# Patient Record
Sex: MALE | Race: WHITE | NOT HISPANIC OR LATINO | Employment: OTHER | URBAN - METROPOLITAN AREA
[De-identification: names, ages, dates, MRNs, and addresses within clinical notes are randomized per-mention and may not be internally consistent; named-entity substitution may affect disease eponyms.]

---

## 2018-03-27 ENCOUNTER — OFFICE VISIT (OUTPATIENT)
Dept: URGENT CARE | Facility: MEDICAL CENTER | Age: 63
End: 2018-03-27
Payer: COMMERCIAL

## 2018-03-27 VITALS
TEMPERATURE: 97.8 F | OXYGEN SATURATION: 93 % | WEIGHT: 158.8 LBS | HEART RATE: 70 BPM | HEIGHT: 67 IN | DIASTOLIC BLOOD PRESSURE: 70 MMHG | BODY MASS INDEX: 24.92 KG/M2 | SYSTOLIC BLOOD PRESSURE: 132 MMHG | RESPIRATION RATE: 18 BRPM
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
## 2018-03-27 PROCEDURE — 99203 OFFICE O/P NEW LOW 30 MIN: CPT | Performed by: PHYSICIAN ASSISTANT

## 2018-03-27 RX ORDER — OMEPRAZOLE 20 MG/1
20 CAPSULE, DELAYED RELEASE ORAL DAILY
COMMUNITY

## 2018-03-27 RX ORDER — MELATONIN
1000 DAILY
COMMUNITY

## 2018-03-27 RX ORDER — CLINDAMYCIN HYDROCHLORIDE 150 MG/1
300 CAPSULE ORAL 3 TIMES DAILY
Qty: 30 CAPSULE | Refills: 0 | Status: SHIPPED | OUTPATIENT
Start: 2018-03-27 | End: 2018-03-27 | Stop reason: SDUPTHER

## 2018-03-27 RX ORDER — PHENOL 1.4 %
600 AEROSOL, SPRAY (ML) MUCOUS MEMBRANE 2 TIMES DAILY WITH MEALS
COMMUNITY

## 2018-03-27 RX ORDER — CLINDAMYCIN HYDROCHLORIDE 150 MG/1
300 CAPSULE ORAL 3 TIMES DAILY
Qty: 30 CAPSULE | Refills: 0 | Status: SHIPPED | OUTPATIENT
Start: 2018-03-27 | End: 2018-04-01

## 2018-03-27 RX ORDER — SPIRONOLACTONE 25 MG/1
25 TABLET ORAL DAILY
COMMUNITY

## 2018-03-27 RX ORDER — FUROSEMIDE 20 MG/1
20 TABLET ORAL DAILY
COMMUNITY

## 2018-03-27 RX ORDER — SULFAMETHOXAZOLE AND TRIMETHOPRIM 800; 160 MG/1; MG/1
1 TABLET ORAL EVERY 12 HOURS SCHEDULED
Qty: 10 TABLET | Refills: 0 | Status: SHIPPED | OUTPATIENT
Start: 2018-03-27 | End: 2018-04-01

## 2018-03-27 RX ORDER — SULFAMETHOXAZOLE AND TRIMETHOPRIM 800; 160 MG/1; MG/1
1 TABLET ORAL EVERY 12 HOURS SCHEDULED
Qty: 10 TABLET | Refills: 0 | Status: SHIPPED | OUTPATIENT
Start: 2018-03-27 | End: 2018-03-27 | Stop reason: SDUPTHER

## 2018-03-27 RX ORDER — PROPRANOLOL HYDROCHLORIDE 10 MG/1
10 TABLET ORAL 3 TIMES DAILY
COMMUNITY

## 2018-03-27 NOTE — PATIENT INSTRUCTIONS
Dog bite  Tetanus administered in office  Wound cleaned and wrapped  Wash daily with soap and water  Apply neosporin and keep covered  Take clindamycin and bactrim as directed for 5 days  Take with food and eat yogurt or a probiotic to avoid GI upset  Follow up with your PCP in 2-5 days for wound check  Watch for signs of infection and follow up sooner if they occur  Go to the ER for any distress

## 2018-03-27 NOTE — PROGRESS NOTES
[facility]        NAME: Nathaniel Palacios is a 60 y o  male  : 1955    MRN: [id number]  DATE: 2018  TIME: 7:04 PM    Assessment and Plan   Dog bite, initial encounter [W54  0XXA]  1  Dog bite, initial encounter  TDAP Vaccine greater than or equal to 8yo    clindamycin (CLEOCIN) 150 mg capsule    sulfamethoxazole-trimethoprim (BACTRIM DS) 800-160 mg per tablet         Patient Instructions     Tetanus administered in office  Wound cleaned and wrapped  Wash daily with soap and water  Apply neosporin and keep covered  Take clindamycin and bactrim as directed for 5 days  Take with food and eat yogurt or a probiotic to avoid GI upset  Follow up with your PCP in 2-5 days for wound check  Watch for signs of infection and follow up sooner if they occur  Go to the ER for any distress  Follow up with PCP in 3-5 days  Proceed to  ER if symptoms worsen  Chief Complaint     Chief Complaint   Patient presents with    Dog Bite         History of Present Illness       This is a 60year old male presenting for dog bite 3 hours ago  He was cleaning his own dogs ear when it bit him on the left hand  He is left hand dominant  He does not know when his last tetanus shot was  The dog is up to date on its rabies vaccinations  He denies numbness, tingling, weakness in the hand  He has a history of cirrhosis and does get infections easily  Review of Systems   Review of Systems   Constitutional: Negative for chills, fatigue and fever  HENT: Negative  Respiratory: Negative  Cardiovascular: Negative  Musculoskeletal: Negative for arthralgias, gait problem, joint swelling, myalgias, neck pain and neck stiffness  Skin: Positive for wound  Negative for color change, pallor and rash  Neurological: Negative for dizziness, weakness and numbness           Current Medications       Current Outpatient Prescriptions:     calcium carbonate (OS-EVY) 600 MG tablet, Take 600 mg by mouth 2 (two) times a day with meals, Disp: , Rfl:     cholecalciferol (VITAMIN D3) 1,000 units tablet, Take 1,000 Units by mouth daily, Disp: , Rfl:     furosemide (LASIX) 20 mg tablet, Take 20 mg by mouth daily, Disp: , Rfl:     Macitentan (OPSUMIT PO), Take 1 tablet by mouth daily, Disp: , Rfl:     omeprazole (PriLOSEC) 20 mg delayed release capsule, Take 20 mg by mouth daily, Disp: , Rfl:     propranolol (INDERAL) 10 mg tablet, Take 10 mg by mouth 3 (three) times a day, Disp: , Rfl:     rifaximin (XIFAXAN) 200 mg tablet, Take 200 mg by mouth 2 (two) times a day, Disp: , Rfl:     spironolactone (ALDACTONE) 25 mg tablet, Take 25 mg by mouth daily, Disp: , Rfl:     clindamycin (CLEOCIN) 150 mg capsule, Take 2 capsules (300 mg total) by mouth 3 (three) times a day for 5 days, Disp: 30 capsule, Rfl: 0    sulfamethoxazole-trimethoprim (BACTRIM DS) 800-160 mg per tablet, Take 1 tablet by mouth every 12 (twelve) hours for 5 days, Disp: 10 tablet, Rfl: 0    Current Allergies     Allergies as of 03/27/2018    (No Known Allergies)            The following portions of the patient's history were reviewed and updated as appropriate: allergies, current medications, past family history, past medical history, past social history, past surgical history and problem list      Past Medical History:   Diagnosis Date    [facility] ([facility] )     GERD (gastroesophageal reflux disease)     Pulmonary HTN        Past Surgical History:   Procedure Laterality Date    CHOLECYSTECTOMY         No family history on file  Medications have been verified  Objective   /70   Pulse 70   Temp 97 8 °F (36 6 °C) (Temporal)   Resp 18   Ht 5' 7" (1 702 m)   Wt 72 kg (158 lb 12 8 oz)   SpO2 93%   BMI 24 87 kg/m²        Physical Exam     Physical Exam   Constitutional: He appears well-developed and well-nourished  No distress  Musculoskeletal: Normal range of motion  He exhibits tenderness  He exhibits no edema or deformity  Neurological: He is alert  Skin: Skin is warm and dry  He is not diaphoretic  Left hand: there are 3 larger puncture wounds on the dorsal aspect of the hand at the base of the metacarpals with one small skin-flap wound  About 3 cm distal to the first 3 wounds are another 3 puncture wounds, much smaller in size  There is one more puncture wound just distal to the second row  There is a single puncture wound on the palmar surface of the hand  Hemostasis achieved with pressure  There are no suturable wounds  No drainage, skin redness, swelling  FROM of wrist and fingers, sensation intact, cap refill in the distal fingers is less than 3 seconds  Nursing note and vitals reviewed  The wound is irrigated with saline and scrubbed with surgical scrub  The wound is wrapped with gauze  Tdap administered

## 2019-08-20 ENCOUNTER — TRANSCRIBE ORDERS (OUTPATIENT)
Dept: ADMINISTRATIVE | Facility: HOSPITAL | Age: 64
End: 2019-08-20

## 2019-08-20 DIAGNOSIS — K73.9 CHRONIC HEPATITIS (HCC): Primary | ICD-10-CM

## 2019-09-04 ENCOUNTER — HOSPITAL ENCOUNTER (OUTPATIENT)
Dept: MRI IMAGING | Facility: HOSPITAL | Age: 64
Discharge: HOME | End: 2019-09-04
Payer: COMMERCIAL

## 2019-09-04 DIAGNOSIS — K73.9 CHRONIC HEPATITIS (HCC): ICD-10-CM

## 2019-09-04 PROCEDURE — 74183 MRI ABD W/O CNTR FLWD CNTR: CPT

## 2019-09-04 PROCEDURE — A9585 GADOBUTROL INJECTION: HCPCS | Performed by: RADIOLOGY

## 2019-09-04 RX ADMIN — GADOBUTROL 7 ML: 604.72 INJECTION INTRAVENOUS at 15:27

## 2020-02-21 ENCOUNTER — OFFICE VISIT (OUTPATIENT)
Dept: URGENT CARE | Facility: MEDICAL CENTER | Age: 65
End: 2020-02-21
Payer: COMMERCIAL

## 2020-02-21 VITALS
HEART RATE: 74 BPM | HEIGHT: 67 IN | TEMPERATURE: 97.7 F | SYSTOLIC BLOOD PRESSURE: 110 MMHG | WEIGHT: 155.38 LBS | DIASTOLIC BLOOD PRESSURE: 70 MMHG | RESPIRATION RATE: 18 BRPM | BODY MASS INDEX: 24.39 KG/M2
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
## 2020-02-21 DIAGNOSIS — S41.151A DOG BITE OF RIGHT UPPER EXTREMITY, INITIAL ENCOUNTER: Primary | ICD-10-CM

## 2020-02-21 PROCEDURE — 99213 OFFICE O/P EST LOW 20 MIN: CPT | Performed by: FAMILY MEDICINE

## 2020-02-21 RX ORDER — AMOXICILLIN AND CLAVULANATE POTASSIUM 875; 125 MG/1; MG/1
1 TABLET, FILM COATED ORAL EVERY 12 HOURS SCHEDULED
Qty: 14 TABLET | Refills: 0 | Status: SHIPPED | OUTPATIENT
Start: 2020-02-21 | End: 2020-02-28

## 2020-02-21 RX ORDER — AMOXICILLIN AND CLAVULANATE POTASSIUM 875; 125 MG/1; MG/1
1 TABLET, FILM COATED ORAL EVERY 12 HOURS SCHEDULED
Qty: 14 TABLET | Refills: 0 | Status: SHIPPED | OUTPATIENT
Start: 2020-02-21 | End: 2020-02-21 | Stop reason: SDUPTHER

## 2020-02-21 NOTE — PATIENT INSTRUCTIONS
Ice for 20-30 minutes, 3 to 4 times a day  Ibuprofen for pain  Follow up with PCP in 3-5 days  Proceed to  ER if symptoms worsen  Animal Bite   WHAT YOU NEED TO KNOW:   Animal bite injuries range from shallow cuts to deep, life-threatening wounds  An animal can cut or puncture the skin when it bites  Your skin may be torn from your body  Your skin may swell or bruise even if the bite does not break the skin  Animal bites occur more often on the hands, arms, legs, and face  Bites from dogs and cats are the most common injuries  DISCHARGE INSTRUCTIONS:   Return to the emergency department if:   · You have a fever  · Your wound is red, swollen, and draining pus  · You see red streaks on the skin around the wound  · You can no longer move the bitten area  · Your heartbeat and breathing are much faster than usual     · You feel dizzy and confused  Contact your healthcare provider if:   · Your pain does not get better, even after you take pain medicine  · You have nightmares or flashbacks about the animal bite  · You have questions or concerns about your condition or care  Medicines: You may need any of the following:  · Antibiotics  prevent or treat a bacterial infection  · Prescription pain medicine  may be given  Ask how to take this medicine safely  · A tetanus vaccine  may be needed to prevent tetanus  Tetanus is a life-threatening bacterial infection that affects the nerves and muscles  The bacteria can be spread through animal bites  · A rabies vaccine  may be needed to prevent rabies  Rabies is a life-threatening viral infection  The virus can be spread through animal bites  · Take your medicine as directed  Contact your healthcare provider if you think your medicine is not helping or if you have side effects  Tell him of her if you are allergic to any medicine  Keep a list of the medicines, vitamins, and herbs you take   Include the amounts, and when and why you take them  Bring the list or the pill bottles to follow-up visits  Carry your medicine list with you in case of an emergency  Follow up with your healthcare provider in 1 to 2 days: You may need to return to have your stitches removed  Write down your questions so you remember to ask them during your visits  Self-care:   · Apply antibiotic ointment as directed  This helps prevent infection in minor skin wounds  It is available without a doctor's order  · Keep the wound clean and covered  Wash the wound every day with soap and water or germ-killing cleanser  Ask your healthcare provider about the kinds of bandages to use  · Apply ice on your wound  Ice helps decrease swelling and pain  Ice may also help prevent tissue damage  Use an ice pack, or put crushed ice in a plastic bag  Cover it with a towel and place it on your wound for 15 to 20 minutes every hour or as directed  · Elevate the wound area  Raise your wound above the level of your heart as often as you can  This will help decrease swelling and pain  Prop your wound on pillows or blankets to keep it elevated comfortably  Prevent another animal bite:   · Learn to recognize the signs of a scared or angry pet  Avoid quick, sudden movements  · Do not step between animals that are fighting  · Do not leave a pet alone with a young child  · Do not disturb an animal while it eats, sleeps, or cares for its young  · Do not approach an animal you do not know, especially one that is tied up or caged  · Stay away from animals that seem sick or act strangely  · Do not feed or capture wild animals  © 2017 [employer]  Information is for End User's use only and may not be sold, redistributed or otherwise used for commercial purposes  All illustrations and images included in CareNotes® are the copyrighted property of [employer] M [employer]  or [employer]  The above information is an  only   It is not intended as medical advice for individual conditions or treatments  Talk to your doctor, nurse or pharmacist before following any medical regimen to see if it is safe and effective for you

## 2020-02-21 NOTE — PROGRESS NOTES
[facility]        NAME: Adline Frankel is a 71 y o  male  : 1955    MRN: [id number]  DATE: 2020  TIME: 8:37 AM    Assessment and Plan   Dog bite of right upper extremity, initial encounter [S41 151A, W54  0XXA]  1  Dog bite of right upper extremity, initial encounter  amoxicillin-clavulanate (AUGMENTIN) 875-125 mg per tablet         Patient Instructions     Ice for 20-30 minutes, 3 to 4 times a day  Ibuprofen for pain  Follow up with PCP in 3-5 days  Proceed to  ER if symptoms worsen  Chief Complaint     Chief Complaint   Patient presents with    Animal Bite     rigth wrist and forearm  area is swollen  Pt was trying to separte two dogs  one being the neighbors the other was foster dog  foster dog was a pit bull   this dog was sent back to recuse center  pt stated that dog was up to date with vaccine  he beileves it was his dog that bite his right forearm and wirst  both dog are up to date with rabies vaccine  History of Present Illness       Animal Bite (rigth wrist and forearm  area is swollen  Pt was trying to separte two dogs  one being the neighbors the other was foster dog  foster dog was a pit bull   this dog was sent back to recuse center  pt stated that dog was up to date with vaccine  he beileves it was his dog that bite his right forearm and wrist  both dog are up to date with rabies vaccine  )  Incident occurred on 02/15/2020      Review of Systems   Review of Systems   Constitutional: Negative  Respiratory: Negative  Cardiovascular: Negative  Skin: Positive for wound           Current Medications       Current Outpatient Medications:     calcium carbonate (OS-EVY) 600 MG tablet, Take 600 mg by mouth 2 (two) times a day with meals, Disp: , Rfl:     cholecalciferol (VITAMIN D3) 1,000 units tablet, Take 1,000 Units by mouth daily, Disp: , Rfl:     furosemide (LASIX) 20 mg tablet, Take 20 mg by mouth daily, Disp: , Rfl:     Macitentan (OPSUMIT PO), Take 1 tablet by mouth daily, Disp: , Rfl:     omeprazole (PriLOSEC) 20 mg delayed release capsule, Take 20 mg by mouth daily, Disp: , Rfl:     propranolol (INDERAL) 10 mg tablet, Take 10 mg by mouth 3 (three) times a day, Disp: , Rfl:     rifaximin (XIFAXAN) 200 mg tablet, Take 200 mg by mouth 2 (two) times a day, Disp: , Rfl:     spironolactone (ALDACTONE) 25 mg tablet, Take 25 mg by mouth daily, Disp: , Rfl:     amoxicillin-clavulanate (AUGMENTIN) 875-125 mg per tablet, Take 1 tablet by mouth every 12 (twelve) hours for 7 days, Disp: 14 tablet, Rfl: 0    Current Allergies     Allergies as of 02/21/2020    (No Known Allergies)            The following portions of the patient's history were reviewed and updated as appropriate: allergies, current medications, past family history, past medical history, past social history, past surgical history and problem list      Past Medical History:   Diagnosis Date    [facility]     GERD (gastroesophageal reflux disease)     Pulmonary HTN ([facility] )        Past Surgical History:   Procedure Laterality Date    CHOLECYSTECTOMY         No family history on file  Medications have been verified  Objective   /70   Pulse 74   Temp 97 7 °F (36 5 °C)   Resp 18   Ht 5' 7" (1 702 m)   Wt 70 5 kg (155 lb 6 oz)   BMI 24 34 kg/m²        Physical Exam     Physical Exam   Constitutional: He appears well-developed and well-nourished  Cardiovascular: Normal rate and regular rhythm     Pulmonary/Chest: Effort normal and breath sounds normal    Skin:

## 2020-03-17 ENCOUNTER — APPOINTMENT (OUTPATIENT)
Dept: RADIOLOGY | Facility: MEDICAL CENTER | Age: 65
End: 2020-03-17
Payer: COMMERCIAL

## 2020-03-17 ENCOUNTER — OFFICE VISIT (OUTPATIENT)
Dept: URGENT CARE | Facility: MEDICAL CENTER | Age: 65
End: 2020-03-17
Payer: COMMERCIAL

## 2020-03-17 VITALS
HEIGHT: 67 IN | RESPIRATION RATE: 20 BRPM | HEART RATE: 64 BPM | OXYGEN SATURATION: 93 % | TEMPERATURE: 97.5 F | DIASTOLIC BLOOD PRESSURE: 72 MMHG | WEIGHT: 155 LBS | SYSTOLIC BLOOD PRESSURE: 148 MMHG | BODY MASS INDEX: 24.33 KG/M2

## 2020-03-17 DIAGNOSIS — M79.641 PAIN OF RIGHT HAND: ICD-10-CM

## 2020-03-17 DIAGNOSIS — M79.641 PAIN OF RIGHT HAND: Primary | ICD-10-CM

## 2020-03-17 PROCEDURE — 73130 X-RAY EXAM OF HAND: CPT

## 2020-03-17 PROCEDURE — G0382 LEV 3 HOSP TYPE B ED VISIT: HCPCS | Performed by: PHYSICIAN ASSISTANT

## 2020-03-17 PROCEDURE — S9083 URGENT CARE CENTER GLOBAL: HCPCS | Performed by: PHYSICIAN ASSISTANT

## 2020-03-17 RX ORDER — SULFAMETHOXAZOLE AND TRIMETHOPRIM 800; 160 MG/1; MG/1
1 TABLET ORAL EVERY 12 HOURS SCHEDULED
Qty: 14 TABLET | Refills: 0 | Status: SHIPPED | OUTPATIENT
Start: 2020-03-17 | End: 2020-03-24

## 2020-03-17 NOTE — PATIENT INSTRUCTIONS
Hand pain  Bactrim DS twice daily  Follow up with orthopedics  Follow up with PCP in 3-5 days  Proceed to  ER if symptoms worsen  Arthralgia   WHAT YOU NEED TO KNOW:   Arthralgia is pain in one or more joints, with no inflammation  It may be short-term and get better within 6 to 8 weeks  Arthralgia can be an early sign of arthritis  Arthralgia may be caused by a medical condition, such as a hormone disorder or a tumor  It may also be caused by an infection or injury  DISCHARGE INSTRUCTIONS:   Medicines: The following medicines may  be ordered for you:  · Acetaminophen  decreases pain  Ask how much to take and how often to take it  Follow directions  Acetaminophen can cause liver damage if not taken correctly  · NSAIDs  decrease pain and prevent swelling  Ask your healthcare provider which medicine is right for you  Ask how much to take and when to take it  Take as directed  NSAIDs can cause stomach bleeding and kidney problems if not taken correctly  · Pain relief cream  decreases pain  Use this cream as directed  · Take your medicine as directed  Contact your healthcare provider if you think your medicine is not helping or if you have side effects  Tell him of her if you are allergic to any medicine  Keep a list of the medicines, vitamins, and herbs you take  Include the amounts, and when and why you take them  Bring the list or the pill bottles to follow-up visits  Carry your medicine list with you in case of an emergency  Follow up with your healthcare provider or specialist as directed:  Write down your questions so you remember to ask them during your visits  Self-care:   · Apply heat  to help decrease pain  Use a heating pad or heat wrap  Apply heat for 20 to 30 minutes every 2 hours for as many days as directed  · Rest  as much as possible  Avoid activities that cause joint pain  · Apply ice  to help decrease swelling and pain  Ice may also help prevent tissue damage   Use an ice pack, or put crushed ice in a plastic bag  Cover it with a towel and place it on your painful joint for 15 to 20 minutes every hour or as directed  · Support  the joint with a brace or elastic wrap as directed  · Elevate  your joint above the level of your heart as often as you can to help decrease swelling and pain  Prop your painful joint on pillows or blankets to keep it elevated comfortably  · Lose weight  if you are overweight  Extra weight can put pressure on your joints and cause more pain  Ask your healthcare provider how much you should weigh  Ask him to help you create a weight loss plan  · Exercise  regularly to help improve joint movement and to decrease pain  Ask about the best exercise plan for you  Low-impact exercises can help take the pressure off your joints  Examples are walking, swimming, and water aerobics  Physical therapy:  A physical therapist teaches you exercises to help improve movement and strength, and to decrease pain  Ask your healthcare provider if physical therapy is right for you  Contact your healthcare provider or specialist if:   · You have a fever  · You continue to have joint pain that cannot be relieved with heat, ice, or medicine  · You have pain and inflammation around your joint  · You have questions or concerns about your condition or care  Return to the emergency department if:   · You have sudden, severe pain when you move your joint  · You have a fever and shaking chills  · You cannot move your joint  · You lose feeling on the side of your body where you have the painful joint  © 2017 [employer] Information is for End User's use only and may not be sold, redistributed or otherwise used for commercial purposes  All illustrations and images included in CareNotes® are the copyrighted property of [employer] Inc  or [employer]  The above information is an  only   It is not intended as medical advice for individual conditions or treatments  Talk to your doctor, nurse or pharmacist before following any medical regimen to see if it is safe and effective for you

## 2020-03-17 NOTE — PROGRESS NOTES
[facility]        NAME: Sukhdeep Figueroa is a 71 y o  male  : 1955    MRN: [id number]  DATE: 2020  TIME: 10:47 AM    Assessment and Plan   Pain of right hand [M79 641]  1  Pain of right hand  XR hand 3+ vw right         Patient Instructions     Hand pain  Bactrim DS twice daily  Follow up with orthopedics  Follow up with PCP in 3-5 days  Proceed to  ER if symptoms worsen  Chief Complaint     Chief Complaint   Patient presents with    Dog Bite     Pt  with a dog bite to his right hand about 4 5 weeks ago  He was treated with antibiotics at that time, but his hand is still red and painful  History of Present Illness       72 y/o male presents c/o pain to right hand  States he was bitten by a dog 1 month ago and was treated with antibiotics but the area is still swollen and painful  Review of Systems   Review of Systems   Constitutional: Negative  HENT: Negative  Eyes: Negative  Respiratory: Negative  Negative for apnea, cough, choking, chest tightness, shortness of breath, wheezing and stridor  Cardiovascular: Negative  Negative for chest pain  Musculoskeletal: Positive for arthralgias           Current Medications       Current Outpatient Medications:     calcium carbonate (OS-EVY) 600 MG tablet, Take 600 mg by mouth 2 (two) times a day with meals, Disp: , Rfl:     cholecalciferol (VITAMIN D3) 1,000 units tablet, Take 1,000 Units by mouth daily, Disp: , Rfl:     furosemide (LASIX) 20 mg tablet, Take 20 mg by mouth daily, Disp: , Rfl:     omeprazole (PriLOSEC) 20 mg delayed release capsule, Take 20 mg by mouth daily, Disp: , Rfl:     propranolol (INDERAL) 10 mg tablet, Take 10 mg by mouth 3 (three) times a day, Disp: , Rfl:     spironolactone (ALDACTONE) 25 mg tablet, Take 25 mg by mouth daily, Disp: , Rfl:     Macitentan (OPSUMIT PO), Take 1 tablet by mouth daily, Disp: , Rfl:     rifaximin (XIFAXAN) 200 mg tablet, Take 200 mg by mouth 2 (two) times a day, Disp: , Rfl:     Current Allergies     Allergies as of 03/17/2020    (No Known Allergies)            The following portions of the patient's history were reviewed and updated as appropriate: allergies, current medications, past family history, past medical history, past social history, past surgical history and problem list      Past Medical History:   Diagnosis Date    Cirrhosis ([facility] )     GERD (gastroesophageal reflux disease)     Pulmonary HTN ([facility] )        Past Surgical History:   Procedure Laterality Date    CHOLECYSTECTOMY         No family history on file  Medications have been verified  Objective   /72   Pulse 64   Temp 97 5 °F (36 4 °C) (Temporal)   Resp 20   Ht 5' 7" (1 702 m)   Wt 70 3 kg (155 lb)   SpO2 93%   BMI 24 28 kg/m²        Physical Exam     Physical Exam   Constitutional: He appears well-developed and well-nourished  No distress  Neck: Normal range of motion  Neck supple  Cardiovascular: Normal rate, regular rhythm, normal heart sounds and intact distal pulses  Pulmonary/Chest: Effort normal and breath sounds normal  No stridor  No respiratory distress  He has no wheezes  He has no rales  He exhibits no tenderness  Musculoskeletal:        Right wrist: He exhibits decreased range of motion, tenderness and swelling  He exhibits no bony tenderness, no effusion, no crepitus, no deformity and no laceration  Lymphadenopathy:     He has no cervical adenopathy  Skin: He is not diaphoretic

## 2021-03-26 ENCOUNTER — IMMUNIZATIONS (OUTPATIENT)
Dept: FAMILY MEDICINE CLINIC | Facility: HOSPITAL | Age: 66
End: 2021-03-26

## 2021-03-26 DIAGNOSIS — Z23 ENCOUNTER FOR IMMUNIZATION: Primary | ICD-10-CM

## 2021-03-26 PROCEDURE — 91300 SARS-COV-2 / COVID-19 MRNA VACCINE (PFIZER-BIONTECH) 30 MCG: CPT

## 2021-03-26 PROCEDURE — 0001A SARS-COV-2 / COVID-19 MRNA VACCINE (PFIZER-BIONTECH) 30 MCG: CPT

## 2021-04-16 ENCOUNTER — IMMUNIZATIONS (OUTPATIENT)
Dept: FAMILY MEDICINE CLINIC | Facility: HOSPITAL | Age: 66
End: 2021-04-16

## 2021-04-16 DIAGNOSIS — Z23 ENCOUNTER FOR IMMUNIZATION: Primary | ICD-10-CM

## 2021-04-16 PROCEDURE — 91300 SARS-COV-2 / COVID-19 MRNA VACCINE (PFIZER-BIONTECH) 30 MCG: CPT

## 2021-04-16 PROCEDURE — 0002A SARS-COV-2 / COVID-19 MRNA VACCINE (PFIZER-BIONTECH) 30 MCG: CPT

## 2021-10-18 ENCOUNTER — TELEPHONE (OUTPATIENT)
Dept: GASTROENTEROLOGY | Facility: CLINIC | Age: 66
End: 2021-10-18

## 2021-10-28 ENCOUNTER — IMMUNIZATIONS (OUTPATIENT)
Dept: FAMILY MEDICINE CLINIC | Facility: HOSPITAL | Age: 66
End: 2021-10-28

## 2021-10-28 DIAGNOSIS — Z23 ENCOUNTER FOR IMMUNIZATION: Primary | ICD-10-CM

## 2021-10-28 PROCEDURE — 91300 COVID-19 PFIZER VACC 0.3 ML: CPT

## 2021-10-28 PROCEDURE — 0001A COVID-19 PFIZER VACC 0.3 ML: CPT

## 2022-05-30 ENCOUNTER — OFFICE VISIT (OUTPATIENT)
Dept: URGENT CARE | Age: 67
End: 2022-05-30
Payer: COMMERCIAL

## 2022-05-30 VITALS
RESPIRATION RATE: 18 BRPM | DIASTOLIC BLOOD PRESSURE: 75 MMHG | HEART RATE: 90 BPM | OXYGEN SATURATION: 94 % | TEMPERATURE: 97.5 F | SYSTOLIC BLOOD PRESSURE: 150 MMHG

## 2022-05-30 DIAGNOSIS — B96.89 BACTERIAL UPPER RESPIRATORY INFECTION: Primary | ICD-10-CM

## 2022-05-30 DIAGNOSIS — J06.9 BACTERIAL UPPER RESPIRATORY INFECTION: Primary | ICD-10-CM

## 2022-05-30 PROCEDURE — S9083 URGENT CARE CENTER GLOBAL: HCPCS | Performed by: NURSE PRACTITIONER

## 2022-05-30 PROCEDURE — G0382 LEV 3 HOSP TYPE B ED VISIT: HCPCS | Performed by: NURSE PRACTITIONER

## 2022-05-30 RX ORDER — PREDNISONE 10 MG/1
10 TABLET ORAL 2 TIMES DAILY WITH MEALS
Qty: 10 TABLET | Refills: 0 | Status: SHIPPED | OUTPATIENT
Start: 2022-05-30 | End: 2022-06-04

## 2022-05-30 RX ORDER — AZITHROMYCIN 250 MG/1
TABLET, FILM COATED ORAL
Qty: 6 TABLET | Refills: 0 | Status: SHIPPED | OUTPATIENT
Start: 2022-05-30 | End: 2022-06-03

## 2022-05-30 NOTE — PROGRESS NOTES
[facility]        NAME: Jordyn Morgan is a 75 y o  male  : 1955    MRN: [id number]  DATE: May 30, 2022  TIME: 12:04 PM    Assessment and Plan   Bacterial upper respiratory infection [J06 9, B96 89]  1  Bacterial upper respiratory infection  azithromycin (ZITHROMAX) 250 mg tablet    predniSONE 10 mg tablet         Patient Instructions     Take med as directed  Follow up with PCP in 3-5 days  Proceed to  ER if symptoms worsen  Chief Complaint     Chief Complaint   Patient presents with    Cough    Shortness of Breath    Nasal Congestion         History of Present Illness       HPI   Reports cough, SOB and nasal congestion, x 2 weeks  Did not do the home covid test  Fully covid vaccinated and booster x 1  Review of Systems   Review of Systems   Constitutional: Negative for chills and fever  HENT: Negative for sore throat and trouble swallowing  Respiratory: Positive for cough, chest tightness and shortness of breath (with exertion)  Negative for wheezing  Cardiovascular: Negative for chest pain  Gastrointestinal: Negative for nausea and vomiting  Neurological: Negative for dizziness and headaches           Current Medications       Current Outpatient Medications:     azithromycin (ZITHROMAX) 250 mg tablet, Take 2 tablets today then 1 tablet daily x 4 days, Disp: 6 tablet, Rfl: 0    predniSONE 10 mg tablet, Take 1 tablet (10 mg total) by mouth 2 (two) times a day with meals for 5 days, Disp: 10 tablet, Rfl: 0    calcium carbonate (OS-EVY) 600 MG tablet, Take 600 mg by mouth 2 (two) times a day with meals, Disp: , Rfl:     cholecalciferol (VITAMIN D3) 1,000 units tablet, Take 1,000 Units by mouth daily, Disp: , Rfl:     furosemide (LASIX) 20 mg tablet, Take 20 mg by mouth daily, Disp: , Rfl:     Macitentan (OPSUMIT PO), Take 1 tablet by mouth daily, Disp: , Rfl:     omeprazole (PriLOSEC) 20 mg delayed release capsule, Take 20 mg by mouth daily, Disp: , Rfl:    propranolol (INDERAL) 10 mg tablet, Take 10 mg by mouth 3 (three) times a day, Disp: , Rfl:     rifaximin (XIFAXAN) 200 mg tablet, Take 200 mg by mouth 2 (two) times a day, Disp: , Rfl:     spironolactone (ALDACTONE) 25 mg tablet, Take 25 mg by mouth daily, Disp: , Rfl:     Current Allergies     Allergies as of 05/30/2022    (No Known Allergies)            The following portions of the patient's history were reviewed and updated as appropriate: allergies, current medications, past family history, past medical history, past social history, past surgical history and problem list      Past Medical History:   Diagnosis Date    Anemia     [facility] C with cirrhosis ([facility] )     and hepatocellular carcinoma    Cirrhosis ([facility] )     Colitis     GERD (gastroesophageal reflux disease)     Heart disease     Hyperlipidemia     Hypertension     Hypoxemia     Kidney stones     Liver cancer ([facility] )     currently in remission    Liver disease     on liver transplant list    Lower extremity edema     Osteopenia     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     Psoriasis     Pulmonary HTN ([facility] )     Thrombocytopenia ([facility] )        Past Surgical History:   Procedure Laterality Date    CHOLECYSTECTOMY         Family History   Problem Relation Age of Onset    Diabetes Mother     Coronary artery disease Mother     Stroke Father         Major strokes x2     Diabetes Sister          Medications have been verified  Objective   /75   Pulse 90   Temp 97 5 °F (36 4 °C)   Resp 18   SpO2 94%   No LMP for male patient  Physical Exam     Physical Exam  Constitutional:       Appearance: He is not ill-appearing or diaphoretic  HENT:      Left Ear: Tympanic membrane normal       Ears:      Comments: There is cloudy liquid behind the right TM  Nose: Rhinorrhea present  Mouth/Throat:      Mouth: Mucous membranes are moist       Pharynx: No posterior oropharyngeal erythema  Cardiovascular:      Rate and Rhythm: Regular rhythm  Heart sounds: Normal heart sounds  Pulmonary:      Effort: Pulmonary effort is normal       Breath sounds: Normal breath sounds  No wheezing or rhonchi  Lymphadenopathy:      Cervical: No cervical adenopathy

## 2022-12-14 ENCOUNTER — OFFICE VISIT (OUTPATIENT)
Dept: FAMILY MEDICINE CLINIC | Facility: OTHER | Age: 67
End: 2022-12-14

## 2022-12-14 VITALS
RESPIRATION RATE: 16 BRPM | TEMPERATURE: 98.7 F | OXYGEN SATURATION: 90 % | HEART RATE: 101 BPM | HEIGHT: 67 IN | SYSTOLIC BLOOD PRESSURE: 118 MMHG | WEIGHT: 154 LBS | BODY MASS INDEX: 24.17 KG/M2 | DIASTOLIC BLOOD PRESSURE: 78 MMHG

## 2022-12-14 DIAGNOSIS — Z11.59 NEED FOR HEPATITIS C SCREENING TEST: ICD-10-CM

## 2022-12-14 DIAGNOSIS — M25.561 RIGHT KNEE PAIN, UNSPECIFIED CHRONICITY: ICD-10-CM

## 2022-12-14 DIAGNOSIS — Z86.19 HISTORY OF HEPATITIS C: ICD-10-CM

## 2022-12-14 DIAGNOSIS — Z13.6 SCREENING FOR CARDIOVASCULAR CONDITION: ICD-10-CM

## 2022-12-14 DIAGNOSIS — Z76.89 ENCOUNTER TO ESTABLISH CARE WITH NEW DOCTOR: Primary | ICD-10-CM

## 2022-12-14 DIAGNOSIS — Z86.79 HISTORY OF PULMONARY HYPERTENSION: ICD-10-CM

## 2022-12-14 DIAGNOSIS — Z13.1 SCREENING FOR DIABETES MELLITUS: ICD-10-CM

## 2022-12-14 PROBLEM — R06.09 DYSPNEA ON EXERTION: Status: ACTIVE | Noted: 2019-08-12

## 2022-12-14 PROBLEM — R94.31 ABNORMAL ELECTROCARDIOGRAM: Status: ACTIVE | Noted: 2019-08-12

## 2022-12-14 PROBLEM — Z01.810 PREOP CARDIOVASCULAR EXAM: Status: ACTIVE | Noted: 2019-08-12

## 2022-12-14 NOTE — PATIENT INSTRUCTIONS
Colorectal Cancer Screening    There are approximately 150,000 new cases of colorectal cancer diagnosed anually in the [place]    Approximately 53,000 Americans die of colon cancer every year    Colorectal cancer causes 8% of all cancer related deaths in the [place]  That is almost 1 out of every 10 deaths from all cancers  The incidence of colorectal cancer in people under the age of 48 has steadily increased at a rate of 2 percent per year from 18 through 2018    The overall death rate from colorectal cancer have declined overall since the mid-1980s in the [place] and this is partially attributed to increased screening    Screening Procedures    There are multiple ways to screen for colorectal cancer which include:  Colonoscopy  CT Colonography (virtual colonoscopy)  Sigmoidoscopy  Fecal Immunochemical Test (FIT)  DNA Stool Test (cologuard)  High Sensitivity Fecal Occult Blood Test (FOBT)    Colonoscopy remains the gold standard in regards to screening for colorectal cancer  Generally during a colonoscopy, sedation is used to make you comfortable  Then a scope is used to look in the colon to evaluate for evidence of colon polyps or evidence of cancer  Risk Factors    Outside of a genetic predisposition for colorectal cancer, family history is the second most important risk factor  Having a single affected first-degree relative (parent, sibling, or child) with colorectal cancer increases the risk approximately two-fold over that of the general population  Overweight/Obesity  Diabetes mellitus and insulin resistance   Tobacco  Alcohol  Lack of regular physical activity  Diet low in fruits/vegetables, high in processed foods, low in fiber, and/or high in fat    Signs/Symptoms    Change in bowel habits  Diarrhea or constipation persisting longer than 4 weeks  Rectal bleeding  Prolonged abdominal discomfort  Weakness or fatigue  Weight loss    Early detection is BEST!   Schedule your colonoscopy TODAY if you are 45 years or older!

## 2022-12-14 NOTE — PROGRESS NOTES
Name: Kelley Brantley      :       MRN: [id number]  Encounter Provider: oLis Poole MD  Encounter Date: 2022   Encounter department: [address] [place] [address]      1  Encounter to establish care with new doctor    2  History of hepatitis C  -     CBC and differential; Future  -     Comprehensive metabolic panel; Future  -     Hepatitis C antibody; Future    3  History of pulmonary hypertension    4  Right knee pain, unspecified chronicity  -     XR knee 3 vw right non injury; Future; Expected date: 2022  -     Diclofenac Sodium (VOLTAREN) 1 %; Apply 2 g topically 4 (four) times a day    5  Screening for diabetes mellitus  -     CBC and differential; Future  -     Comprehensive metabolic panel; Future  -     HEMOGLOBIN A1C W/ EAG ESTIMATION; Future    6  Screening for cardiovascular condition  -     CBC and differential; Future  -     Lipid panel; Future    7  Need for hepatitis C screening test  -     Hepatitis C antibody; Future      BMI Counseling: There is no height or weight on file to calculate BMI  Follow-up plan was not completed due to elderly patient (71 years old) where weight reduction/weight gain would complicate underlying health condition such as: illness or physical disability  Falls Plan of Care: balance, strength, and gait training instructions were provided  Discussed with patient the need for labs and follow up  Ordering labs to establish care and evaluate current health, especially of his liver  Will consider GI referral pending lab results  Subjective      Patient presents today to establish care and to discuss inflammation of his R knee  Patient reports 3-4 weeks ago he woke up with knee pain  Pain has been worse with walking and bending  The pain would last all day and would get worse throughout the day  He endorses the knee was red but wasn't swollen   He cannot recall any specific inciting event to cause injury  He hasn't been taking any medicine or doing anything for the pain  He describes the pain as sharp  Patient has cirrhosis and pulmonary hypertension, but does not take any medications  He was previously seeing doctors down in [place], sometimes multiple times a week  Patient has a history of hepatitis C, which he reports was treated  He last saw his doctors down in [place] 6+ months ago, and he does not intend to return due to the driving burden  Patient reports he is only concerned with his knee today and doesn't want to get labs done or treat other chronic diseases  He reports his hepatitis C was cured and that though he was originally being considered for liver transplant, they sent him to another hospital and his care fell through  He stopped taking medications for his pulmonary hypertension because he didn't feel they were helping  Knee Pain   The incident occurred more than 1 week ago  There was no injury mechanism  The pain is present in the right knee  The quality of the pain is described as aching and stabbing  The pain has been fluctuating since onset  Associated symptoms include an inability to bear weight and a loss of motion  He reports no foreign bodies present  The symptoms are aggravated by movement  He has tried nothing for the symptoms  Review of Systems   Constitutional: Negative for chills, fever and unexpected weight change  HENT: Negative for postnasal drip, rhinorrhea, sinus pressure, sneezing, sore throat and trouble swallowing  Eyes: Negative for pain and visual disturbance  Respiratory: Positive for shortness of breath  Negative for cough and chest tightness  Cardiovascular: Negative for chest pain and palpitations  Gastrointestinal: Negative for abdominal pain, blood in stool, constipation, diarrhea, nausea and vomiting  Endocrine: Negative for polydipsia and polyuria  Genitourinary: Negative for difficulty urinating, dysuria and hematuria  Musculoskeletal: Positive for arthralgias and back pain  Skin: Negative for color change and rash  Allergic/Immunologic: Negative for environmental allergies and food allergies  Neurological: Negative for syncope and headaches  Hematological: Does not bruise/bleed easily  Psychiatric/Behavioral: The patient is not nervous/anxious  All other systems reviewed and are negative  Current Outpatient Medications on File Prior to Visit   Medication Sig   • [DISCONTINUED] calcium carbonate (OS-EVY) 600 MG tablet Take 600 mg by mouth 2 (two) times a day with meals (Patient not taking: Reported on 12/14/2022)   • [DISCONTINUED] cholecalciferol (VITAMIN D3) 1,000 units tablet Take 1,000 Units by mouth daily (Patient not taking: Reported on 12/14/2022)   • [DISCONTINUED] furosemide (LASIX) 20 mg tablet Take 20 mg by mouth daily (Patient not taking: Reported on 12/14/2022)   • [DISCONTINUED] Macitentan (OPSUMIT PO) Take 1 tablet by mouth daily (Patient not taking: Reported on 12/14/2022)   • [DISCONTINUED] omeprazole (PriLOSEC) 20 mg delayed release capsule Take 20 mg by mouth daily (Patient not taking: Reported on 12/14/2022)   • [DISCONTINUED] propranolol (INDERAL) 10 mg tablet Take 10 mg by mouth 3 (three) times a day (Patient not taking: Reported on 12/14/2022)   • [DISCONTINUED] rifaximin (XIFAXAN) 200 mg tablet Take 200 mg by mouth 2 (two) times a day (Patient not taking: Reported on 12/14/2022)   • [DISCONTINUED] spironolactone (ALDACTONE) 25 mg tablet Take 25 mg by mouth daily (Patient not taking: Reported on 12/14/2022)       Objective     /78   Pulse 101   Temp 98 7 °F (37 1 °C)   Resp 16   Ht 5' 7" (1 702 m)   Wt 69 9 kg (154 lb)   SpO2 90%   BMI 24 12 kg/m²     Physical Exam  Vitals reviewed  Constitutional:       General: He is not in acute distress  Appearance: Normal appearance  He is normal weight  He is not ill-appearing, toxic-appearing or diaphoretic     HENT:      Head: Normocephalic and atraumatic  Nose: No congestion or rhinorrhea  Mouth/Throat:      Mouth: Mucous membranes are moist    Eyes:      General: No scleral icterus  Right eye: No discharge  Left eye: No discharge  Conjunctiva/sclera: Conjunctivae normal    Cardiovascular:      Rate and Rhythm: Normal rate and regular rhythm  Heart sounds: Normal heart sounds  No murmur heard  No gallop  Pulmonary:      Effort: Pulmonary effort is normal  No respiratory distress  Breath sounds: Decreased air movement present  No stridor  No wheezing, rhonchi or rales  Chest:      Chest wall: No tenderness  Abdominal:      General: Abdomen is flat  Bowel sounds are normal  There is no distension  Palpations: Abdomen is soft  Tenderness: There is no abdominal tenderness  There is no guarding  Musculoskeletal:         General: No swelling, tenderness, deformity or signs of injury  Cervical back: Neck supple  Right knee: Normal  No swelling, deformity, effusion, erythema, ecchymosis, lacerations, bony tenderness or crepitus  Normal range of motion  No tenderness  Normal alignment, normal meniscus and normal patellar mobility  Left knee: Normal  No swelling, deformity, effusion, erythema, ecchymosis, lacerations, bony tenderness or crepitus  Normal range of motion  No tenderness  Normal alignment, normal meniscus and normal patellar mobility  Right lower leg: No edema  Left lower leg: No edema  Lymphadenopathy:      Cervical: No cervical adenopathy  Skin:     General: Skin is warm and dry  Capillary Refill: Capillary refill takes less than 2 seconds  Coloration: Skin is not jaundiced or pale  Findings: No bruising, erythema, lesion or rash  Neurological:      Mental Status: He is alert  Mental status is at baseline  Sensory: No sensory deficit  Motor: No weakness        Gait: Gait normal    Psychiatric:         Mood and Affect: Mood normal          Behavior: Behavior normal          Thought Content:  Thought content normal          Judgment: Judgment normal        Farzaneh Chery MD

## 2023-01-04 ENCOUNTER — APPOINTMENT (OUTPATIENT)
Dept: LAB | Facility: CLINIC | Age: 68
End: 2023-01-04

## 2023-01-04 ENCOUNTER — HOSPITAL ENCOUNTER (OUTPATIENT)
Dept: RADIOLOGY | Facility: HOSPITAL | Age: 68
Discharge: HOME | End: 2023-01-04

## 2023-01-04 DIAGNOSIS — Z11.59 NEED FOR HEPATITIS C SCREENING TEST: ICD-10-CM

## 2023-01-04 DIAGNOSIS — Z86.19 HISTORY OF HEPATITIS C: ICD-10-CM

## 2023-01-04 DIAGNOSIS — Z13.1 SCREENING FOR DIABETES MELLITUS: ICD-10-CM

## 2023-01-04 DIAGNOSIS — Z13.6 SCREENING FOR CARDIOVASCULAR CONDITION: ICD-10-CM

## 2023-01-04 DIAGNOSIS — M25.561 RIGHT KNEE PAIN, UNSPECIFIED CHRONICITY: ICD-10-CM

## 2023-01-04 LAB
ALBUMIN SERPL BCP-MCNC: 3.1 G/DL (ref 3.5–5)
ALP SERPL-CCNC: 76 U/L (ref 34–104)
ALT SERPL W P-5'-P-CCNC: 29 U/L (ref 7–52)
ANION GAP SERPL CALCULATED.3IONS-SCNC: 2 MMOL/L (ref 4–13)
AST SERPL W P-5'-P-CCNC: 41 U/L (ref 13–39)
BASOPHILS # BLD AUTO: 0.05 THOUSANDS/ÂΜL (ref 0–0.1)
BASOPHILS NFR BLD AUTO: 1 % (ref 0–1)
BILIRUB SERPL-MCNC: 4.63 MG/DL (ref 0.2–1)
BUN SERPL-MCNC: 11 MG/DL (ref 5–25)
CALCIUM ALBUM COR SERPL-MCNC: 9.7 MG/DL (ref 8.3–10.1)
CALCIUM SERPL-MCNC: 9 MG/DL (ref 8.4–10.2)
CHLORIDE SERPL-SCNC: 107 MMOL/L (ref 96–108)
CHOLEST SERPL-MCNC: 147 MG/DL
CO2 SERPL-SCNC: 28 MMOL/L (ref 21–32)
CREAT SERPL-MCNC: 0.81 MG/DL (ref 0.6–1.3)
EOSINOPHIL # BLD AUTO: 0.23 THOUSAND/ÂΜL (ref 0–0.61)
EOSINOPHIL NFR BLD AUTO: 5 % (ref 0–6)
ERYTHROCYTE [DISTWIDTH] IN BLOOD BY AUTOMATED COUNT: 14.8 % (ref 11.6–15.1)
GFR SERPL CREATININE-BSD FRML MDRD: 91 ML/MIN/1.73SQ M
GLUCOSE P FAST SERPL-MCNC: 140 MG/DL (ref 65–99)
HCT VFR BLD AUTO: 46.7 % (ref 36.5–49.3)
HDLC SERPL-MCNC: 48 MG/DL
HGB BLD-MCNC: 16.4 G/DL (ref 12–17)
IMM GRANULOCYTES # BLD AUTO: 0.02 THOUSAND/UL (ref 0–0.2)
IMM GRANULOCYTES NFR BLD AUTO: 1 % (ref 0–2)
LDLC SERPL CALC-MCNC: 85 MG/DL (ref 0–100)
LYMPHOCYTES # BLD AUTO: 0.99 THOUSANDS/ÂΜL (ref 0.6–4.47)
LYMPHOCYTES NFR BLD AUTO: 23 % (ref 14–44)
MCH RBC QN AUTO: 32.7 PG (ref 26.8–34.3)
MCHC RBC AUTO-ENTMCNC: 35.1 G/DL (ref 31.4–37.4)
MCV RBC AUTO: 93 FL (ref 82–98)
MONOCYTES # BLD AUTO: 0.36 THOUSAND/ÂΜL (ref 0.17–1.22)
MONOCYTES NFR BLD AUTO: 8 % (ref 4–12)
NEUTROPHILS # BLD AUTO: 2.68 THOUSANDS/ÂΜL (ref 1.85–7.62)
NEUTS SEG NFR BLD AUTO: 62 % (ref 43–75)
NONHDLC SERPL-MCNC: 99 MG/DL
NRBC BLD AUTO-RTO: 0 /100 WBCS
PLATELET # BLD AUTO: 53 THOUSANDS/UL (ref 149–390)
PMV BLD AUTO: 9.6 FL (ref 8.9–12.7)
POTASSIUM SERPL-SCNC: 4.3 MMOL/L (ref 3.5–5.3)
PROT SERPL-MCNC: 7.2 G/DL (ref 6.4–8.4)
RBC # BLD AUTO: 5.02 MILLION/UL (ref 3.88–5.62)
SODIUM SERPL-SCNC: 137 MMOL/L (ref 135–147)
TRIGL SERPL-MCNC: 68 MG/DL
WBC # BLD AUTO: 4.33 THOUSAND/UL (ref 4.31–10.16)

## 2023-01-05 LAB — HCV AB SER QL: ABNORMAL

## 2023-01-06 LAB
EST. AVERAGE GLUCOSE BLD GHB EST-MCNC: 111 MG/DL
HBA1C MFR BLD: 5.5 %

## 2023-01-11 ENCOUNTER — OFFICE VISIT (OUTPATIENT)
Dept: FAMILY MEDICINE CLINIC | Facility: OTHER | Age: 68
End: 2023-01-11

## 2023-01-11 ENCOUNTER — PATIENT OUTREACH (OUTPATIENT)
Dept: FAMILY MEDICINE CLINIC | Facility: OTHER | Age: 68
End: 2023-01-11

## 2023-01-11 VITALS
WEIGHT: 157 LBS | HEIGHT: 67 IN | OXYGEN SATURATION: 94 % | DIASTOLIC BLOOD PRESSURE: 64 MMHG | TEMPERATURE: 98.2 F | HEART RATE: 65 BPM | SYSTOLIC BLOOD PRESSURE: 112 MMHG | BODY MASS INDEX: 24.64 KG/M2 | RESPIRATION RATE: 16 BRPM

## 2023-01-11 DIAGNOSIS — M25.561 RIGHT KNEE PAIN, UNSPECIFIED CHRONICITY: ICD-10-CM

## 2023-01-11 DIAGNOSIS — B18.2 CHRONIC HEPATITIS C WITHOUT HEPATIC COMA (HCC): ICD-10-CM

## 2023-01-11 DIAGNOSIS — Z59.89 INSURANCE COVERAGE PROBLEMS: Primary | ICD-10-CM

## 2023-01-11 SDOH — ECONOMIC STABILITY - INCOME SECURITY: OTHER PROBLEMS RELATED TO HOUSING AND ECONOMIC CIRCUMSTANCES: Z59.89

## 2023-01-11 NOTE — PROGRESS NOTES
Name: Lonnie Resendiz      :       MRN: [id number]  Encounter Provider: Cristino Bunn MD  Encounter Date: 2023   Encounter department: [address] [place] [address]      1  Insurance coverage problems  -     Ambulatory Referral to Social Work Care Management Program; Future    2  Chronic hepatitis C without hepatic coma ([facility] )    3  Right knee pain, unspecified chronicity        Depression Screening and Follow-up Plan: Patient was screened for depression during today's encounter  They screened negative with a PHQ-2 score of 0  Plan: follow up in 3 months  - recheck knee pain   - discuss hepatitis   - assess for other needs         Subjective      Patient presents today for follow up on his knee pain  He's been using the voltaren gel prescribed at his last visit  He reports his pain has improved significantly with use of the voltaren  He has been able to kneel on the floor again with his dog  Patient was not interested in discussing the results of his labwork  I did  patient that his Hep C test was positive, and his platelets and albumin levels indicate evidence of liver dysfunction  Patient was surprised to learn about his active hep C, as he believed it had been cured  Counseled patient about the recommendation to refer to [facility]  At this time, he is not interested in seeking treatment for his liver disease, as he "feels fine " Patient reassured that if he changes his mind in the future I would be happy to place referral      Patient does have concerns about losing his insurance at the end of the month as his wife retires  He is interested in referral to  for assistance establishing medicare  Review of Systems   Constitutional: Negative for chills, fatigue, fever and unexpected weight change  HENT: Negative for ear pain, postnasal drip and sore throat  Eyes: Negative for pain and visual disturbance  Respiratory: Negative for cough and shortness of breath  Cardiovascular: Negative for chest pain and palpitations  Gastrointestinal: Negative for abdominal pain, constipation, diarrhea and vomiting  Endocrine: Negative for polydipsia and polyuria  Genitourinary: Negative for difficulty urinating, dysuria and hematuria  Musculoskeletal: Negative for arthralgias and back pain  Skin: Negative for color change and rash  Allergic/Immunologic: Negative for environmental allergies and food allergies  Neurological: Negative for seizures, syncope and facial asymmetry  Hematological: Negative for adenopathy  Psychiatric/Behavioral: Negative for dysphoric mood  All other systems reviewed and are negative  Current Outpatient Medications on File Prior to Visit   Medication Sig   • Diclofenac Sodium (VOLTAREN) 1 % Apply 2 g topically 4 (four) times a day       Objective     /64   Pulse 65   Temp 98 2 °F (36 8 °C)   Resp 16   Ht 5' 7" (1 702 m)   Wt 71 2 kg (157 lb)   SpO2 94%   BMI 24 59 kg/m²             Physical Exam  Vitals reviewed  Constitutional:       General: He is not in acute distress  Appearance: Normal appearance  He is normal weight  He is not ill-appearing, toxic-appearing or diaphoretic  HENT:      Head: Normocephalic and atraumatic  Nose: No congestion or rhinorrhea  Mouth/Throat:      Mouth: Mucous membranes are moist    Eyes:      General:         Right eye: No discharge  Left eye: No discharge  Cardiovascular:      Rate and Rhythm: Normal rate and regular rhythm  Heart sounds: Normal heart sounds  No murmur heard  No gallop  Pulmonary:      Effort: Pulmonary effort is normal  No respiratory distress  Breath sounds: Normal breath sounds  No stridor  No wheezing, rhonchi or rales  Chest:      Chest wall: No tenderness  Abdominal:      General: Abdomen is flat  There is no distension  Palpations: Abdomen is soft  Tenderness: There is no abdominal tenderness  There is no guarding  Musculoskeletal:         General: No swelling, tenderness, deformity or signs of injury  Right lower leg: No edema  Left lower leg: No edema  Skin:     General: Skin is warm and dry  Capillary Refill: Capillary refill takes less than 2 seconds  Coloration: Skin is not jaundiced or pale  Findings: No bruising, erythema, lesion or rash  Neurological:      Mental Status: He is alert  Mental status is at baseline  Sensory: No sensory deficit  Motor: No weakness  Gait: Gait normal    Psychiatric:         Mood and Affect: Mood normal          Behavior: Behavior normal          Thought Content: Thought content normal          Judgment: Judgment normal        Labs:     Component      Latest Ref Rng & Units 1/4/2023   Hemoglobin A1C      Normal 3 8-5 6%; PreDiabetic 5 7-6 4%;  Diabetic >=6 5%; Glycemic control for adults with diabetes <7 0% % 5 5   eAG, EST AVG Glucose      mg/dl 111     Component      Latest Ref Rng & Units 1/4/2023   HEPATITIS C ANTIBODY      Non-reactive High Reactive (A)     Component      Latest Ref Rng & Units 1/4/2023   Cholesterol      See Comment mg/dL 147   Triglycerides      See Comment mg/dL 68   HDL      >=40 mg/dL 48   LDL Calculated      0 - 100 mg/dL 85   Non-HDL Cholesterol      mg/dl 99     Component      Latest Ref Rng & Units 1/4/2023   Sodium      135 - 147 mmol/L 137   Potassium      3 5 - 5 3 mmol/L 4 3   Chloride      96 - 108 mmol/L 107   CO2      21 - 32 mmol/L 28   Anion Gap      4 - 13 mmol/L 2 (L)   BUN      5 - 25 mg/dL 11   Creatinine      0 60 - 1 30 mg/dL 0 81   GLUCOSE FASTING      65 - 99 mg/dL 140 (H)   Calcium      8 4 - 10 2 mg/dL 9 0   CORRECTED CALCIUM      8 3 - 10 1 mg/dL 9 7   AST      13 - 39 U/L 41 (H)   ALT      7 - 52 U/L 29   Alkaline Phosphatase      34 - 104 U/L 76   Total Protein      6 4 - 8 4 g/dL 7 2   Albumin      3 5 - 5 0 g/dL 3 1 (L) TOTAL BILIRUBIN      0 20 - 1 00 mg/dL 4 63 (H)   eGFR      ml/min/1 73sq m 91     Component      Latest Ref Rng & Units 1/4/2023   WBC      4 31 - 10 16 Thousand/uL 4 33   Red Blood Cell Count      3 88 - 5 62 Million/uL 5 02   Hemoglobin      12 0 - 17 0 g/dL 16 4   HCT      36 5 - 49 3 % 46 7   MCV      82 - 98 fL 93   MCH      26 8 - 34 3 pg 32 7   MCHC      31 4 - 37 4 g/dL 35 1   RDW      11 6 - 15 1 % 14 8   MPV      8 9 - 12 7 fL 9 6   Platelet Count      291 - 390 Thousands/uL 53 (L)   nRBC      /100 WBCs 0   Neutrophils %      43 - 75 % 62   Immat GRANS %      0 - 2 % 1   Lymphocytes Relative      14 - 44 % 23   Monocytes Relative      4 - 12 % 8   Eosinophils      0 - 6 % 5   Basophils Relative      0 - 1 % 1   Absolute Neutrophils      1 85 - 7 62 Thousands/µL 2 68   Immature Grans Absolute      0 00 - 0 20 Thousand/uL 0 02   Lymphocytes Absolute      0 60 - 4 47 Thousands/µL 0 99   Absolute Monocytes      0 17 - 1 22 Thousand/µL 0 36   Absolute Eosinophils      0 00 - 0 61 Thousand/µL 0 23   Basophils Absolute      0 00 - 0 10 Thousands/µL 0 05       Imaging:     RIGHT KNEE XRAY 1/4/2023     INDICATION:   M25 561: Pain in right knee      COMPARISON:  None     VIEWS:  XR KNEE 3 VW RIGHT NON INJURY         FINDINGS:     There is no acute fracture or dislocation      There is no joint effusion      There is mild medial compartment joint space narrowing      No lytic or blastic osseous lesion      There is mild prepatellar soft tissue swelling      IMPRESSION:     No acute osseous abnormality            Workstation performed: [id number]              Chong Sam MD Million/uL 5 02   Hemoglobin      12 0 - 17 0 g/dL 16 4   HCT      36 5 - 49 3 % 46 7   MCV      82 - 98 fL 93   MCH      26 8 - 34 3 pg 32 7   MCHC      31 4 - 37 4 g/dL 35 1   RDW      11 6 - 15 1 % 14 8   MPV      8 9 - 12 7 fL 9 6   Platelet Count      585 - 390 Thousands/uL 53 (L)   nRBC      /100 WBCs 0   Neutrophils %      43 - 75 % 62   Immat GRANS %      0 - 2 % 1   Lymphocytes Relative      14 - 44 % 23   Monocytes Relative      4 - 12 % 8   Eosinophils      0 - 6 % 5   Basophils Relative      0 - 1 % 1   Absolute Neutrophils      1 85 - 7 62 Thousands/µL 2 68   Immature Grans Absolute      0 00 - 0 20 Thousand/uL 0 02   Lymphocytes Absolute      0 60 - 4 47 Thousands/µL 0 99   Absolute Monocytes      0 17 - 1 22 Thousand/µL 0 36   Absolute Eosinophils      0 00 - 0 61 Thousand/µL 0 23   Basophils Absolute      0 00 - 0 10 Thousands/µL 0 05       Imaging:     RIGHT KNEE XRAY 1/4/2023     INDICATION:   M25 561: Pain in right knee      COMPARISON:  None     VIEWS:  XR KNEE 3 VW RIGHT NON INJURY         FINDINGS:     There is no acute fracture or dislocation      There is no joint effusion      There is mild medial compartment joint space narrowing      No lytic or blastic osseous lesion      There is mild prepatellar soft tissue swelling      IMPRESSION:     No acute osseous abnormality            Workstation performed: [id number]              Allison Reddy MD

## 2023-01-12 NOTE — PROGRESS NOTES
EMILY called and spoke with pt  EMILY informed pt that , to SW knowledge, pt is in need of help with Medicare  Pt said he has Medicare but is interested in getting part C      EMILY provided the phone number for  [employer], as pt reportedly is not 'computer savvy'  EMILY told pt that they can best assist him with questions and applying for part C      EMILY reviewed SDOH with pt  Pt said he is okay with food, housing, utilities, has no other needs  SW told pt he may call me if he needs additional help, or has any other needs, at any time  Pt  appreciated the outreach

## 2023-09-21 ENCOUNTER — OFFICE VISIT (OUTPATIENT)
Dept: FAMILY MEDICINE CLINIC | Facility: OTHER | Age: 68
End: 2023-09-21
Payer: MEDICARE

## 2023-09-21 VITALS
HEART RATE: 82 BPM | DIASTOLIC BLOOD PRESSURE: 70 MMHG | WEIGHT: 154 LBS | HEIGHT: 67 IN | SYSTOLIC BLOOD PRESSURE: 138 MMHG | BODY MASS INDEX: 24.17 KG/M2 | OXYGEN SATURATION: 96 % | TEMPERATURE: 98 F

## 2023-09-21 DIAGNOSIS — E78.5 HYPERLIPIDEMIA, UNSPECIFIED HYPERLIPIDEMIA TYPE: ICD-10-CM

## 2023-09-21 DIAGNOSIS — K74.60 HEPATIC CIRRHOSIS, UNSPECIFIED HEPATIC CIRRHOSIS TYPE, UNSPECIFIED WHETHER ASCITES PRESENT (HCC): ICD-10-CM

## 2023-09-21 DIAGNOSIS — Z00.00 ENCOUNTER FOR MEDICARE ANNUAL WELLNESS EXAM: Primary | ICD-10-CM

## 2023-09-21 DIAGNOSIS — R06.09 DYSPNEA ON EXERTION: ICD-10-CM

## 2023-09-21 DIAGNOSIS — Z12.11 COLON CANCER SCREENING: ICD-10-CM

## 2023-09-21 DIAGNOSIS — D69.6 PLATELETS DECREASED (HCC): ICD-10-CM

## 2023-09-21 DIAGNOSIS — I27.20 PULMONARY HYPERTENSION (HCC): ICD-10-CM

## 2023-09-21 PROCEDURE — G0438 PPPS, INITIAL VISIT: HCPCS | Performed by: STUDENT IN AN ORGANIZED HEALTH CARE EDUCATION/TRAINING PROGRAM

## 2023-09-21 NOTE — PATIENT INSTRUCTIONS
Medicare Preventive Visit Patient Instructions  Thank you for completing your Welcome to Medicare Visit or Medicare Annual Wellness Visit today. Your next wellness visit will be due in one year (9/21/2024). The screening/preventive services that you may require over the next 5-10 years are detailed below. Some tests may not apply to you based off risk factors and/or age. Screening tests ordered at today's visit but not completed yet may show as past due. Also, please note that scanned in results may not display below. Preventive Screenings:  Service Recommendations Previous Testing/Comments   Colorectal Cancer Screening  · Colonoscopy    · Fecal Occult Blood Test (FOBT)/Fecal Immunochemical Test (FIT)  · Fecal DNA/Cologuard Test  · Flexible Sigmoidoscopy Age: 43-73 years old   Colonoscopy: every 10 years (May be performed more frequently if at higher risk)  OR  FOBT/FIT: every 1 year  OR  Cologuard: every 3 years  OR  Sigmoidoscopy: every 5 years  Screening may be recommended earlier than age 39 if at higher risk for colorectal cancer. Also, an individualized decision between you and your healthcare provider will decide whether screening between the ages of 77-80 would be appropriate.  Colonoscopy: Not on file  FOBT/FIT: Not on file  Cologuard: Not on file  Sigmoidoscopy: Not on file          Prostate Cancer Screening Individualized decision between patient and health care provider in men between ages of 53-66   Medicare will cover every 12 months beginning on the day after your 50th birthday PSA: No results in last 5 years           Hepatitis C Screening Once for adults born between 1945 and 1965  More frequently in patients at high risk for Hepatitis C Hep C Antibody: 01/04/2023    Screening Not Indicated  History Hepatitis C   Diabetes Screening 1-2 times per year if you're at risk for diabetes or have pre-diabetes Fasting glucose: 140 mg/dL (1/4/2023)  A1C: 5.5 % (1/4/2023)  Screening Current   Cholesterol Screening Once every 5 years if you don't have a lipid disorder. May order more often based on risk factors. Lipid panel: 01/04/2023  Screening Current      Other Preventive Screenings Covered by Medicare:  1. Abdominal Aortic Aneurysm (AAA) Screening: covered once if your at risk. You're considered to be at risk if you have a family history of AAA or a male between the age of 70-76 who smoking at least 100 cigarettes in your lifetime. 2. Lung Cancer Screening: covers low dose CT scan once per year if you meet all of the following conditions: (1) Age 48-67; (2) No signs or symptoms of lung cancer; (3) Current smoker or have quit smoking within the last 15 years; (4) You have a tobacco smoking history of at least 20 pack years (packs per day x number of years you smoked); (5) You get a written order from a healthcare provider. 3. Glaucoma Screening: covered annually if you're considered high risk: (1) You have diabetes OR (2) Family history of glaucoma OR (3)  aged 48 and older OR (3)  American aged 72 and older  3. Osteoporosis Screening: covered every 2 years if you meet one of the following conditions: (1) Have a vertebral abnormality; (2) On glucocorticoid therapy for more than 3 months; (3) Have primary hyperparathyroidism; (4) On osteoporosis medications and need to assess response to drug therapy. 5. HIV Screening: covered annually if you're between the age of 14-79. Also covered annually if you are younger than 13 and older than 72 with risk factors for HIV infection. For pregnant patients, it is covered up to 3 times per pregnancy.     Immunizations:  Immunization Recommendations   Influenza Vaccine Annual influenza vaccination during flu season is recommended for all persons aged >= 6 months who do not have contraindications   Pneumococcal Vaccine   * Pneumococcal conjugate vaccine = PCV13 (Prevnar 13), PCV15 (Vaxneuvance), PCV20 (Prevnar 20)  * Pneumococcal polysaccharide vaccine = PPSV23 (Pneumovax) Adults 2364 years old: 1-3 doses may be recommended based on certain risk factors  Adults 72 years old: 1-2 doses may be recommended based off what pneumonia vaccine you previously received   Hepatitis B Vaccine 3 dose series if at intermediate or high risk (ex: diabetes, end stage renal disease, liver disease)   Tetanus (Td) Vaccine - COST NOT COVERED BY MEDICARE PART B Following completion of primary series, a booster dose should be given every 10 years to maintain immunity against tetanus. Td may also be given as tetanus wound prophylaxis. Tdap Vaccine - COST NOT COVERED BY MEDICARE PART B Recommended at least once for all adults. For pregnant patients, recommended with each pregnancy. Shingles Vaccine (Shingrix) - COST NOT COVERED BY MEDICARE PART B  2 shot series recommended in those aged 48 and above     Health Maintenance Due:      Topic Date Due   • Colorectal Cancer Screening  Never done   • Hepatitis C Screening  Discontinued     Immunizations Due:      Topic Date Due   • Hepatitis A Vaccine (1 of 2 - Risk 2-dose series) Never done   • Hepatitis B Vaccine (1 of 3 - Risk 3-dose series) Never done   • COVID-19 Vaccine (4 - Pfizer series) 12/23/2021   • Pneumococcal Vaccine: 65+ Years (3 - PPSV23 if available, else PCV20) 03/03/2022   • Influenza Vaccine (1) 09/01/2023     Advance Directives   What are advance directives? Advance directives are legal documents that state your wishes and plans for medical care. These plans are made ahead of time in case you lose your ability to make decisions for yourself. Advance directives can apply to any medical decision, such as the treatments you want, and if you want to donate organs. What are the types of advance directives? There are many types of advance directives, and each state has rules about how to use them. You may choose a combination of any of the following:  · Living will: This is a written record of the treatment you want. You can also choose which treatments you do not want, which to limit, and which to stop at a certain time. This includes surgery, medicine, IV fluid, and tube feedings. · Durable power of  for healthcare [facility]): This is a written record that states who you want to make healthcare choices for you when you are unable to make them for yourself. This person, called a proxy, is usually a family member or a friend. You may choose more than 1 proxy. · Do not resuscitate (DNR) order:  A DNR order is used in case your heart stops beating or you stop breathing. It is a request not to have certain forms of treatment, such as CPR. A DNR order may be included in other types of advance directives. · Medical directive: This covers the care that you want if you are in a coma, near death, or unable to make decisions for yourself. You can list the treatments you want for each condition. Treatment may include pain medicine, surgery, blood transfusions, dialysis, IV or tube feedings, and a ventilator (breathing machine). · Values history: This document has questions about your views, beliefs, and how you feel and think about life. This information can help others choose the care that you would choose. Why are advance directives important? An advance directive helps you control your care. Although spoken wishes may be used, it is better to have your wishes written down. Spoken wishes can be misunderstood, or not followed. Treatments may be given even if you do not want them. An advance directive may make it easier for your family to make difficult choices about your care.    Narcotic (Opioid) Safety    Use narcotics safely:  · Take prescribed narcotics exactly as directed  · Do not give narcotics to others or take narcotics that belong to someone else  · Do not mix narcotics without medicines or alcohol  · Do not drive or operate heavy machinery after you take the narcotic  · Monitor for side effects and notify your healthcare provider if you experienced side effects such as nausea, sleepiness, itching, or trouble thinking clearly. Manage constipation:    Constipation is the most common side effect of narcotic medicine. Constipation is when you have hard, dry bowel movements, or you go longer than usual between bowel movements. Tell your healthcare provider about all changes in your bowel movements while you are taking narcotics. He or she may recommend laxative medicine to help you have a bowel movement. He or she may also change the kind of narcotic you are taking, or change when you take it. The following are more ways you can prevent or relieve constipation:    · Drink liquids as directed. You may need to drink extra liquids to help soften and move your bowels. Ask how much liquid to drink each day and which liquids are best for you. · Eat high-fiber foods. This may help decrease constipation by adding bulk to your bowel movements. High-fiber foods include fruits, vegetables, whole-grain breads and cereals, and beans. Your healthcare provider or dietitian can help you create a high-fiber meal plan. Your provider may also recommend a fiber supplement if you cannot get enough fiber from food. · Exercise regularly. Regular physical activity can help stimulate your intestines. Walking is a good exercise to prevent or relieve constipation. Ask which exercises are best for you. · Schedule a time each day to have a bowel movement. This may help train your body to have regular bowel movements. Bend forward while you are on the toilet to help move the bowel movement out. Sit on the toilet for at least 10 minutes, even if you do not have a bowel movement. Store narcotics safely:   · Store narcotics where others cannot easily get them. Keep them in a locked cabinet or secure area. Do not  keep them in a purse or other bag you carry with you. A person may be looking for something else and find the narcotics.   · Make sure narcotics are stored out of the reach of children. A child can easily overdose on narcotics. Narcotics may look like candy to a small child. The best way to dispose of narcotics: The laws vary by country and area. In the [facility], the best way is to return the narcotics through a take-back program. This program is offered by the [employer] ([employer]). The following are options for using the program:  · Take the narcotics to a NAHID collection site. The site is often a law enforcement center. Call your local law enforcement center for scheduled take-back days in your area. You will be given information on where to go if the collection site is in a different location. · Take the narcotics to an approved pharmacy or hospital.  A pharmacy or hospital may be set up as a collection site. You will need to ask if it is a NAHID collection site if you were not directed there. A pharmacy or doctor's office may not be able to take back narcotics unless it is a NAHID site. · Use a mail-back system. This means you are given containers to put the narcotics into. You will then mail them in the containers. · Use a take-back drop box. This is a place to leave the narcotics at any time. People and animals will not be able to get into the box. Your local law enforcement agency can tell you where to find a drop box in your area. Other ways to manage pain:   · Ask your healthcare provider about non-narcotic medicines to control pain. Nonprescription medicines include NSAIDs (such as ibuprofen) and acetaminophen. Prescription medicines include muscle relaxers, antidepressants, and steroids. · Pain may be managed without any medicines. Some ways to relieve pain include massage, aromatherapy, or meditation. Physical or occupational therapy may also help.     For more information:   · Drug Enforcement Administration  [address] , [address]  Phone: 1- 196 - 517-6273  Web Address: [employer]it. usdoj.gov/drug_disposal/    · [address] [facility] [address] and Drug Administration  [address] [facility] , [address]  Phone: 6- 047 - 109-0810  Web Address: http://[employer]/     © Copyright [employer] 2018 Information is for End User's use only and may not be sold, redistributed or otherwise used for commercial purposes.  All illustrations and images included in CareNotes® are the copyrighted property of A.D.A.M., Inc. or [address] [place] [address]

## 2023-09-21 NOTE — PROGRESS NOTES
Assessment and Plan:     1. Encounter for Medicare annual wellness exam    2. Pulmonary hypertension (HCC)  Comments:  Untreated fr past 2 years, will get echocardiogram and refer to cardiology  Orders:  -     Echo complete w/ contrast if indicated; Future; Expected date: 09/21/2023    3. Platelets decreased ([address])  Comments:  Platelets 53 4/4310, Will repeat at this time, likely secondary to impaired synthetic function of liver in setting of cirrhosis. If persistent refer to heme  Orders:  -     CBC and differential; Future  -     Protime-INR; Future  -     APTT; Future    4. Hepatic cirrhosis, unspecified hepatic cirrhosis type, unspecified whether ascites present ([address])  Comments:  Repeat CMP and CBC. Will get PT/INR to assess synthetic function as well. GI referral placed  Orders:  -     Comprehensive metabolic panel; Future  -     Protime-INR; Future  -     APTT; Future    5. Hyperlipidemia, unspecified hyperlipidemia type  Comments:  Lipid panel ordered  Orders:  -     Lipid Panel with Direct LDL reflex; Future    6. Dyspnea on exertion  Comments:  Echocardiogram and cardiology referral placed  Orders:  -     Ambulatory Referral to Cardiology; Future    7. Colon cancer screening  -     Ambulatory Referral to Gastroenterology; Future      Preventive health issues were discussed with patient, and age appropriate screening tests were ordered as noted in patient's After Visit Summary. Personalized health advice and appropriate referrals for health education or preventive services given if needed, as noted in patient's After Visit Summary. History of Present Illness:     Patient presents for a Medicare Wellness Visit    HPI   Patient Care Team:  Juan Bocanegra MD as PCP - General (Family Medicine)  [facility] DO Terri as PCP - [address] [place] [address] ([facility])      Mr. Clarita Gonzalez present for his annual medicare wellness. He has no complaints or concerns today. He has a history of Hepatitis C which was treated. He was being evaluated for transplant at the [place] [address] but stopping seeing the GI team there in 2019 because of the length of the commute. He states that since that time he has felt overall well. The paitent has right knee pain, chronic in nature which is well controlled with voltaren. The patient does complain of some dyspnea with work around the house which is quickly relieved with rest. He denies chest pain. In the past he was diagnosed with pulmonary hypertension formerly on Macitentan which he self discontinued. He states that he does not take any medications on a daily basis. Review of Systems:     Review of Systems   Constitutional: Negative for chills and fever. HENT: Negative for ear pain and sore throat. Eyes: Negative for pain and visual disturbance. Respiratory: Negative for cough and shortness of breath. Cardiovascular: Negative for chest pain, palpitations and leg swelling. Gastrointestinal: Positive for abdominal distention. Negative for abdominal pain and vomiting. Musculoskeletal: Positive for arthralgias. Negative for back pain. Skin: Negative for color change and rash. Neurological: Negative for seizures and syncope. All other systems reviewed and are negative.        Problem List:     Patient Active Problem List   Diagnosis   • Abnormal electrocardiogram   • Dyspnea on exertion   • Preop cardiovascular exam   • Platelets decreased (HCC)      Past Medical and Surgical History:     Past Medical History:   Diagnosis Date   • Anemia    • Chronic hepatitis C with cirrhosis ([address])     and hepatocellular carcinoma   • Cirrhosis ([address])    • Colitis    • GERD (gastroesophageal reflux disease)    • Heart disease    • Hyperlipidemia    • Hypertension    • Hypoxemia    • Kidney stones    • Liver cancer ([address])     currently in remission   • Liver disease     on liver transplant list   • Lower extremity edema    • Osteopenia    • 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    • Psoriasis    • Pulmonary HTN (HCC)    • Thrombocytopenia (HCC)      Past Surgical History:   Procedure Laterality Date   • CHOLECYSTECTOMY        Family History:     Family History   Problem Relation Age of Onset   • Diabetes Mother    • Coronary artery disease Mother    • Stroke Father         Major strokes x2    • Diabetes Sister       Social History:     Social History     Socioeconomic History   • Marital status: /Civil Union     Spouse name: Not on file   • Number of children: Not on file   • Years of education: Not on file   • Highest education level: Not on file   Occupational History   • Occupation: Disabled    Tobacco Use   • Smoking status: Former     Types: Cigarettes     Quit date:      Years since quittin.7   • Smokeless tobacco: Never   Substance and Sexual Activity   • Alcohol use: Not Currently     Comment: quit  - As per Marsha    • Drug use: Not Currently     Comment: He used intravenous drugs only once at age 24 - As per Marsha    • Sexual activity: Not on file   Other Topics Concern   • Not on file   Social History Narrative    · Most recent tobacco use screenin2019      · Do you currently or have you served in the [address] [place] [address] [employer]:   No      · Were you activated, into active duty, as a member of the [employer] or as a Reservist:   No      · Sexual orientation:   Heterosexual     As per [facility]     Financial Resource Strain: Unknown (2023)    Overall Financial Resource Strain (CARDIA)    • Difficulty of Paying Living Expenses: Patient refused   Food Insecurity: Not on file   Transportation Needs: Unknown (2023)    PRAPARE - Transportation    • Lack of Transportation (Medical): Patient refused    • Lack of Transportation (Non-Medical): Patient refused   Physical Activity: Not on file   Stress: Not on file   Social Connections: Not on file   Intimate Partner Violence: Not on file   Housing Stability: Not on file      Medications and Allergies:     Current Outpatient Medications   Medication Sig Dispense Refill   • Diclofenac Sodium (VOLTAREN) 1 % Apply 2 g topically 4 (four) times a day 150 g 3     No current facility-administered medications for this visit. No Known Allergies   Immunizations:     Immunization History   Administered Date(s) Administered   • COVID-19 PFIZER VACCINE 0.3 ML IM 03/26/2021, 04/16/2021, 10/28/2021   • INFLUENZA 10/02/2012, 09/02/2015, 10/06/2017, 10/12/2018, 09/02/2020, 10/08/2021, 10/02/2022   • Pneumococcal Conjugate 13-Valent 05/25/2019   • Pneumococcal Polysaccharide PPV23 03/03/2017   • Tdap 03/27/2018      Health Maintenance:         Topic Date Due   • Colorectal Cancer Screening  Never done   • Hepatitis C Screening  Discontinued         Topic Date Due   • Hepatitis A Vaccine (1 of 2 - Risk 2-dose series) Never done   • Hepatitis B Vaccine (1 of 3 - Risk 3-dose series) Never done   • COVID-19 Vaccine (4 - Pfizer series) 12/23/2021   • Pneumococcal Vaccine: 65+ Years (3 - PPSV23 if available, else PCV20) 03/03/2022   • Influenza Vaccine (1) 09/01/2023      Medicare Screening Tests and Risk Assessments:     Mercedes Hernandez is here for his Subsequent Wellness visit. Last Medicare Wellness visit information reviewed, patient interviewed and updates made to the record today. Health Risk Assessment:   Patient rates overall health as good. Patient feels that their physical health rating is same. Patient is satisfied with their life. Eyesight was rated as same. Hearing was rated as same. Patient feels that their emotional and mental health rating is much better. Patients states they are never, rarely angry. Patient states they are never, rarely unusually tired/fatigued. Pain experienced in the last 7 days has been some. Patient's pain rating has been 2/10. Patient states that he has experienced weight loss or gain in last 6 months.      Fall Risk Screening: In the past year, patient has experienced: no history of falling in past year      Home Safety:  Patient does not have trouble with stairs inside or outside of their home. Patient has working smoke alarms and has working carbon monoxide detector. Home safety hazards include: none. Nutrition:   Current diet is Regular. Medications:   Patient is currently taking over-the-counter supplements. OTC medications include: see medication list. Patient is able to manage medications. Activities of Daily Living (ADLs)/Instrumental Activities of Daily Living (IADLs):   Walk and transfer into and out of bed and chair?: No  Dress and groom yourself?: Yes    Bathe or shower yourself?: Yes    Feed yourself? Yes  Do your laundry/housekeeping?: Yes  Manage your money, pay your bills and track your expenses?: Yes  Make your own meals?: Yes    Do your own shopping?: Yes    Advance Care Planning:   Living will: Yes    Durable POA for healthcare: Yes    Advanced directive: Yes      PREVENTIVE SCREENINGS      Cardiovascular Screening:    General: Screening Current      Diabetes Screening:     General: Screening Current      Abdominal Aortic Aneurysm (AAA) Screening:    Risk factors include: age between 70-77 yo and tobacco use        Lung Cancer Screening:     General: Screening Not Indicated      Hepatitis C Screening:    General: Screening Not Indicated and History Hepatitis C    Screening, Brief Intervention, and Referral to Treatment (SBIRT)    Screening      AUDIT-C Screenin) How many drinks did you have on a typical day when you were drinking in the past year? 1 to 2  3) How often did you have 6 or more drinks on one occasion in the past year? never    Review of Current Opioid Use    Opioid Risk Tool (ORT) Interpretation: Complete Opioid Risk Tool (ORT)    No results found.      Physical Exam:     /70   Pulse 82   Temp 98 °F (36.7 °C)   Ht 5' 7" (1.702 m)   Wt 69.9 kg (154 lb)   SpO2 96%   BMI 24.12 kg/m²     Physical Exam  Vitals and nursing note reviewed. Constitutional:       General: He is not in acute distress. Appearance: He is well-developed. HENT:      Head: Normocephalic and atraumatic. Eyes:      General: No scleral icterus. Conjunctiva/sclera: Conjunctivae normal.   Cardiovascular:      Rate and Rhythm: Normal rate and regular rhythm. Heart sounds: No murmur heard. Pulmonary:      Effort: Pulmonary effort is normal. No respiratory distress. Breath sounds: Normal breath sounds. No wheezing, rhonchi or rales. Abdominal:      General: There is distension. Palpations: Abdomen is soft. There is no mass. Tenderness: There is no abdominal tenderness. There is no guarding or rebound. Musculoskeletal:         General: No swelling. Cervical back: Neck supple. Skin:     General: Skin is warm and dry. Neurological:      Mental Status: He is alert.    Psychiatric:         Mood and Affect: Mood normal.          Mary Harvey MD

## 2023-10-02 ENCOUNTER — OFFICE VISIT (OUTPATIENT)
Dept: FAMILY MEDICINE CLINIC | Facility: OTHER | Age: 68
End: 2023-10-02
Payer: COMMERCIAL

## 2023-10-02 VITALS
HEIGHT: 67 IN | DIASTOLIC BLOOD PRESSURE: 68 MMHG | OXYGEN SATURATION: 90 % | TEMPERATURE: 98.4 F | SYSTOLIC BLOOD PRESSURE: 132 MMHG | WEIGHT: 153 LBS | RESPIRATION RATE: 17 BRPM | HEART RATE: 94 BPM | BODY MASS INDEX: 24.01 KG/M2

## 2023-10-02 DIAGNOSIS — J06.9 VIRAL UPPER RESPIRATORY TRACT INFECTION: ICD-10-CM

## 2023-10-02 DIAGNOSIS — R05.1 ACUTE COUGH: ICD-10-CM

## 2023-10-02 DIAGNOSIS — J02.0 STREP PHARYNGITIS: Primary | ICD-10-CM

## 2023-10-02 PROCEDURE — 99214 OFFICE O/P EST MOD 30 MIN: CPT

## 2023-10-02 RX ORDER — BENZONATATE 100 MG/1
100 CAPSULE ORAL 3 TIMES DAILY PRN
Qty: 20 CAPSULE | Refills: 0 | Status: SHIPPED | OUTPATIENT
Start: 2023-10-02

## 2023-10-02 RX ORDER — ALBUTEROL SULFATE 90 UG/1
2 AEROSOL, METERED RESPIRATORY (INHALATION) EVERY 4 HOURS PRN
Qty: 18 G | Refills: 0 | Status: SHIPPED | OUTPATIENT
Start: 2023-10-02

## 2023-10-02 RX ORDER — AMOXICILLIN 875 MG/1
875 TABLET, COATED ORAL 2 TIMES DAILY
Qty: 20 TABLET | Refills: 0 | Status: SHIPPED | OUTPATIENT
Start: 2023-10-02 | End: 2023-10-12

## 2023-10-02 NOTE — PROGRESS NOTES
Name: Socorro Bush      :       MRN: [id number]  Encounter Provider: Julia Carrizales MD  Encounter Date: 10/2/2023   Encounter department: [address]     1. Strep pharyngitis  -     amoxicillin (AMOXIL) 875 mg tablet; Take 1 tablet (875 mg total) by mouth 2 (two) times a day for 10 days    2. Viral upper respiratory tract infection  -     benzonatate (TESSALON PERLES) 100 mg capsule; Take 1 capsule (100 mg total) by mouth 3 (three) times a day as needed for cough  -     albuterol (Ventolin HFA) 90 mcg/act inhaler; Inhale 2 puffs every 4 (four) hours as needed for wheezing (cough)    3. Acute cough  -     benzonatate (TESSALON PERLES) 100 mg capsule; Take 1 capsule (100 mg total) by mouth 3 (three) times a day as needed for cough  -     albuterol (Ventolin HFA) 90 mcg/act inhaler; Inhale 2 puffs every 4 (four) hours as needed for wheezing (cough)    Patient was seen at the clinic today for his cough and other respiratory symptoms. Patient's rapid strep was positive and rapid COVID was negative. Patient was provided with a 10-day course of amoxicillin as well as albuterol and Tessalon Perles for his cough as he is wheezing during my exam.  Patient was also instructed to treat his symptoms symptomatically including ibuprofen use, fluids, and rest.  Patient agrees and understands with plans. Mukul      HPI   Swati Henson is a 30-year-old man who presents with 3 days of intermittent productive white cough. Associated symptoms include rhinorrhea, congestion, and sore throat. The cough starts in his chest and feels very "gravelly". Symptoms improved with ginger tea however he has not taken any medications for symptoms. Coughing worsens his sore throat. Patient does not have any known sick contacts. He has had COVID vaccines and flu vaccines but has not had the most recent flu vaccine this year.   The patient is not taking his hepatitis C medication or medication for pulmonary hypertension however that is not a new change since his current symptoms began    Review of Systems   Constitutional: Negative for appetite change, chills, fatigue and fever. HENT: Positive for congestion, rhinorrhea and sore throat. Eyes: Negative for visual disturbance. Respiratory: Positive for cough (productive, white). Negative for shortness of breath. Cardiovascular: Negative for chest pain and palpitations. Gastrointestinal: Negative for abdominal pain, constipation, diarrhea, nausea and vomiting. Genitourinary: Negative for dysuria. Musculoskeletal: Positive for back pain (chronic). Negative for myalgias. Skin: Negative for rash. Neurological: Negative for dizziness, light-headedness and headaches. Psychiatric/Behavioral: Negative for confusion. Current Outpatient Medications on File Prior to Visit   Medication Sig   • Diclofenac Sodium (VOLTAREN) 1 % Apply 2 g topically 4 (four) times a day       Objective     /68   Pulse 94   Temp 98.4 °F (36.9 °C)   Resp 17   Ht 5' 7" (1.702 m)   Wt 69.4 kg (153 lb)   SpO2 90%   BMI 23.96 kg/m²     Physical Exam  Constitutional:       General: He is not in acute distress. Comments: Body mass index is 23.96 kg/m². HENT:      Head: Normocephalic and atraumatic. Right Ear: External ear normal.      Left Ear: External ear normal.      Mouth/Throat:      Mouth: Mucous membranes are moist.      Pharynx: Posterior oropharyngeal erythema present. Comments: Bifurcating uvula  Eyes:      Extraocular Movements: Extraocular movements intact. Conjunctiva/sclera: Conjunctivae normal.   Cardiovascular:      Rate and Rhythm: Normal rate and regular rhythm. Heart sounds: Normal heart sounds. No murmur heard. Pulmonary:      Breath sounds: Wheezing present. Comments: Increased work of breathing  Abdominal:      General: Abdomen is flat. There is no distension.       Palpations: Abdomen is soft. Tenderness: There is no abdominal tenderness. Musculoskeletal:         General: Normal range of motion. Skin:     General: Skin is warm and dry. Comments: Small 2 cm mass to the left posterior neck   Neurological:      Mental Status: He is alert. Mental status is at baseline.    Psychiatric:         Mood and Affect: Mood normal.         Behavior: Behavior normal.       Emanuel Caballero MD

## 2023-10-09 ENCOUNTER — TELEPHONE (OUTPATIENT)
Dept: FAMILY MEDICINE CLINIC | Facility: OTHER | Age: 68
End: 2023-10-09

## 2023-10-09 DIAGNOSIS — R05.1 ACUTE COUGH: ICD-10-CM

## 2023-10-09 DIAGNOSIS — J06.9 VIRAL UPPER RESPIRATORY TRACT INFECTION: ICD-10-CM

## 2023-10-09 RX ORDER — BENZONATATE 100 MG/1
100 CAPSULE ORAL 3 TIMES DAILY PRN
Qty: 20 CAPSULE | Refills: 0 | Status: SHIPPED | OUTPATIENT
Start: 2023-10-09

## 2023-10-09 NOTE — TELEPHONE ENCOUNTER
Cody, this is Deepthi Nicholas. I was going to see Mrs. Rosa BELTRÁN on last Monday and she prescribed. I have stepped out by the way, she for me, but she gave me Moxie cillin, which is basically an antibiotic and she gave me Benzo DNZONATATE and I have run out of those and I'm still got a bad story, bad call if somebody can get back to me My number is [phone]. I am running my number 360 such [phone]. I don't know, I don't remember my number, but [facility] has a long record or someone has long record, please give me a call back.

## 2023-10-09 NOTE — TELEPHONE ENCOUNTER
Rx sent to [facility] (formerly [facility]). Lm for pt to call back office to check on sx.

## 2023-10-11 ENCOUNTER — HOSPITAL ENCOUNTER (OUTPATIENT)
Dept: RADIOLOGY | Facility: HOSPITAL | Age: 68
Discharge: HOME | End: 2023-10-11
Payer: COMMERCIAL

## 2023-10-11 ENCOUNTER — OFFICE VISIT (OUTPATIENT)
Dept: FAMILY MEDICINE CLINIC | Facility: OTHER | Age: 68
End: 2023-10-11
Payer: COMMERCIAL

## 2023-10-11 VITALS
WEIGHT: 153.2 LBS | TEMPERATURE: 99.1 F | RESPIRATION RATE: 18 BRPM | HEART RATE: 82 BPM | OXYGEN SATURATION: 90 % | SYSTOLIC BLOOD PRESSURE: 110 MMHG | BODY MASS INDEX: 24.04 KG/M2 | HEIGHT: 67 IN | DIASTOLIC BLOOD PRESSURE: 68 MMHG

## 2023-10-11 DIAGNOSIS — J40 BRONCHITIS: ICD-10-CM

## 2023-10-11 DIAGNOSIS — J06.9 UPPER RESPIRATORY TRACT INFECTION, UNSPECIFIED TYPE: ICD-10-CM

## 2023-10-11 DIAGNOSIS — R05.2 SUBACUTE COUGH: ICD-10-CM

## 2023-10-11 DIAGNOSIS — J02.9 SORE THROAT: ICD-10-CM

## 2023-10-11 DIAGNOSIS — J06.9 UPPER RESPIRATORY TRACT INFECTION, UNSPECIFIED TYPE: Primary | ICD-10-CM

## 2023-10-11 LAB — S PYO AG THROAT QL: NEGATIVE

## 2023-10-11 PROCEDURE — 87880 STREP A ASSAY W/OPTIC: CPT

## 2023-10-11 PROCEDURE — 87070 CULTURE OTHR SPECIMN AEROBIC: CPT

## 2023-10-11 PROCEDURE — 99214 OFFICE O/P EST MOD 30 MIN: CPT

## 2023-10-11 PROCEDURE — 71046 X-RAY EXAM CHEST 2 VIEWS: CPT

## 2023-10-11 RX ORDER — PREDNISONE 20 MG/1
40 TABLET ORAL EVERY MORNING
Qty: 10 TABLET | Refills: 0 | Status: SHIPPED | OUTPATIENT
Start: 2023-10-11 | End: 2023-10-16

## 2023-10-11 RX ORDER — GUAIFENESIN 600 MG/1
1200 TABLET, EXTENDED RELEASE ORAL EVERY 12 HOURS SCHEDULED
Qty: 20 TABLET | Refills: 0 | Status: SHIPPED | OUTPATIENT
Start: 2023-10-11 | End: 2023-10-16

## 2023-10-11 NOTE — RESULT ENCOUNTER NOTE
Patient's rapid strep was negative. We will update patient about results. We will follow-up with throat culture that was obtained today.     Carol Lee MD

## 2023-10-11 NOTE — PROGRESS NOTES
Name: Davina Escobar      : [id number]      MRN: [id number]  Encounter Provider: Santhosh Kelsey MD  Encounter Date: 10/11/2023   Encounter department: [address]     1. Upper respiratory tract infection, unspecified type  -     XR chest 1 view; Future; Expected date: 10/11/2023    2. Bronchitis  -     predniSONE 20 mg tablet; Take 2 tablets (40 mg total) by mouth every morning for 5 days    3. Subacute cough  -     XR chest 1 view; Future; Expected date: 10/11/2023  -     guaiFENesin (MUCINEX) 600 mg 12 hr tablet; Take 2 tablets (1,200 mg total) by mouth every 12 (twelve) hours for 5 days    4. Sore throat  -     POCT rapid strepA  -     Throat culture         Patient was examined at the clinic today. He reports improvement from his visit on 10/2/2023 however still reports a productive cough. Patient has wheezing and crackles during my exam as well as erythema to the throat. Patient has been taking antibiotics as prescribed however has not had resolution of his symptoms. We will obtain a chest x-ray at this time. Due to clinical suspicion for bronchitis, will provide prednisone 40 mg for 5 days for inflammatory treatment. Mucinex was provided to the patient to increase his mucus production. His last visit patient was provided with inhaler. I recommended that the patient continue using albuterol every 4 hours as needed for symptoms. Rapid strep and throat culture will be obtained. The patient agrees and understands with plan. Subjective      HPI  Fatoumata Yu is a 59 yo male who presents with two weeks cough. Patient was seen on 10/2/2023 for similar complaints and reports mild improvement in his symptoms however still complains of cough, sore throat, and occasional shortness of breath.   Per the patient he denies chest pain however reports a gravelly feeling in his chest. The patient states that sometimes his throat feels more narrow when he has a lot of phlegm. The cough is productive with off-white sputum and is better at night while he sleeps. He was given antibiotics on his last visit and has been taking them as prescribed. He took the [employer] but did not notice a significant improvement. Originally the patient had a runny nose but does not have that complaint now and the patient reports that his sore throat has improved but is still present. Review of Systems   Constitutional:  Negative for appetite change, chills, fatigue and fever. HENT:  Positive for sore throat. Negative for congestion, ear pain, rhinorrhea, trouble swallowing and voice change. Respiratory:  Positive for cough (off-white in color) and shortness of breath. Cardiovascular:  Negative for chest pain. Right rib pain with cough   Skin:  Negative for rash. Current Outpatient Medications on File Prior to Visit   Medication Sig    albuterol (Ventolin HFA) 90 mcg/act inhaler Inhale 2 puffs every 4 (four) hours as needed for wheezing (cough)    amoxicillin (AMOXIL) 875 mg tablet Take 1 tablet (875 mg total) by mouth 2 (two) times a day for 10 days    benzonatate (TESSALON PERLES) 100 mg capsule Take 1 capsule (100 mg total) by mouth 3 (three) times a day as needed for cough    Diclofenac Sodium (VOLTAREN) 1 % Apply 2 g topically 4 (four) times a day       Objective     /68   Pulse 82   Temp 99.1 °F (37.3 °C)   Resp 18   Ht 5' 7" (1.702 m)   Wt 69.5 kg (153 lb 3.2 oz)   SpO2 90%   BMI 23.99 kg/m²     Physical Exam  Vitals reviewed. Constitutional:       General: He is not in acute distress. Comments: Body mass index is 23.99 kg/m². HENT:      Nose:      Right Sinus: No maxillary sinus tenderness or frontal sinus tenderness. Left Sinus: No maxillary sinus tenderness or frontal sinus tenderness. Mouth/Throat:      Pharynx: Posterior oropharyngeal erythema present. No oropharyngeal exudate. Tonsils: No tonsillar abscesses. Comments: Bifurcating uvula  Eyes:      Conjunctiva/sclera: Conjunctivae normal.   Pulmonary:      Effort: Pulmonary effort is normal.      Breath sounds: Examination of the right-upper field reveals wheezing. Examination of the left-upper field reveals wheezing. Examination of the right-lower field reveals rales. Examination of the left-lower field reveals rales. Wheezing and rales present. Skin:     General: Skin is warm and dry. Neurological:      General: No focal deficit present. Mental Status: He is alert and oriented to person, place, and time.    Psychiatric:         Mood and Affect: Mood normal.         Behavior: Behavior normal.         Sergio Plummer MD

## 2023-10-13 LAB — BACTERIA THROAT CULT: NORMAL

## 2023-10-16 ENCOUNTER — TELEPHONE (OUTPATIENT)
Dept: FAMILY MEDICINE CLINIC | Facility: OTHER | Age: 68
End: 2023-10-16

## 2023-10-16 NOTE — TELEPHONE ENCOUNTER
I updated with patient about his throat culture and X-ray results over the telephone. His X-ray and culture were negative. The patient states he is feeling better. Patient was instructed to reach out with any additional questions or concerns.     Carol Lee MD

## 2023-10-18 ENCOUNTER — LAB (OUTPATIENT)
Dept: LAB | Facility: CLINIC | Age: 68
End: 2023-10-18
Payer: COMMERCIAL

## 2023-10-18 DIAGNOSIS — D69.6 PLATELETS DECREASED (HCC): ICD-10-CM

## 2023-10-18 DIAGNOSIS — K74.60 HEPATIC CIRRHOSIS, UNSPECIFIED HEPATIC CIRRHOSIS TYPE, UNSPECIFIED WHETHER ASCITES PRESENT (HCC): ICD-10-CM

## 2023-10-18 DIAGNOSIS — E78.5 HYPERLIPIDEMIA, UNSPECIFIED HYPERLIPIDEMIA TYPE: ICD-10-CM

## 2023-10-18 LAB
ALBUMIN SERPL BCP-MCNC: 3.3 G/DL (ref 3.5–5)
ALP SERPL-CCNC: 68 U/L (ref 34–104)
ALT SERPL W P-5'-P-CCNC: 44 U/L (ref 7–52)
ANION GAP SERPL CALCULATED.3IONS-SCNC: 7 MMOL/L
APTT PPP: 35 SECONDS (ref 23–37)
AST SERPL W P-5'-P-CCNC: 48 U/L (ref 13–39)
BASOPHILS # BLD AUTO: 0.04 THOUSANDS/ÂΜL (ref 0–0.1)
BASOPHILS NFR BLD AUTO: 1 % (ref 0–1)
BILIRUB SERPL-MCNC: 4.5 MG/DL (ref 0.2–1)
BUN SERPL-MCNC: 18 MG/DL (ref 5–25)
CALCIUM ALBUM COR SERPL-MCNC: 11.3 MG/DL (ref 8.3–10.1)
CALCIUM SERPL-MCNC: 10.7 MG/DL (ref 8.4–10.2)
CHLORIDE SERPL-SCNC: 103 MMOL/L (ref 96–108)
CHOLEST SERPL-MCNC: 164 MG/DL
CO2 SERPL-SCNC: 33 MMOL/L (ref 21–32)
CREAT SERPL-MCNC: 1.03 MG/DL (ref 0.6–1.3)
EOSINOPHIL # BLD AUTO: 0.24 THOUSAND/ÂΜL (ref 0–0.61)
EOSINOPHIL NFR BLD AUTO: 4 % (ref 0–6)
ERYTHROCYTE [DISTWIDTH] IN BLOOD BY AUTOMATED COUNT: 15.3 % (ref 11.6–15.1)
GFR SERPL CREATININE-BSD FRML MDRD: 73 ML/MIN/1.73SQ M
GLUCOSE P FAST SERPL-MCNC: 119 MG/DL (ref 65–99)
HCT VFR BLD AUTO: 52.3 % (ref 36.5–49.3)
HDLC SERPL-MCNC: 56 MG/DL
HGB BLD-MCNC: 18.4 G/DL (ref 12–17)
IMM GRANULOCYTES # BLD AUTO: 0.02 THOUSAND/UL (ref 0–0.2)
IMM GRANULOCYTES NFR BLD AUTO: 0 % (ref 0–2)
INR PPP: 1.46 (ref 0.84–1.19)
LDLC SERPL CALC-MCNC: 93 MG/DL (ref 0–100)
LYMPHOCYTES # BLD AUTO: 1.72 THOUSANDS/ÂΜL (ref 0.6–4.47)
LYMPHOCYTES NFR BLD AUTO: 28 % (ref 14–44)
MCH RBC QN AUTO: 33.4 PG (ref 26.8–34.3)
MCHC RBC AUTO-ENTMCNC: 35.2 G/DL (ref 31.4–37.4)
MCV RBC AUTO: 95 FL (ref 82–98)
MONOCYTES # BLD AUTO: 0.62 THOUSAND/ÂΜL (ref 0.17–1.22)
MONOCYTES NFR BLD AUTO: 10 % (ref 4–12)
NEUTROPHILS # BLD AUTO: 3.57 THOUSANDS/ÂΜL (ref 1.85–7.62)
NEUTS SEG NFR BLD AUTO: 57 % (ref 43–75)
NRBC BLD AUTO-RTO: 0 /100 WBCS
PLATELET # BLD AUTO: 83 THOUSANDS/UL (ref 149–390)
PLATELET BLD QL SMEAR: ABNORMAL
PMV BLD AUTO: 8.6 FL (ref 8.9–12.7)
POTASSIUM SERPL-SCNC: 4.9 MMOL/L (ref 3.5–5.3)
PROT SERPL-MCNC: 7.2 G/DL (ref 6.4–8.4)
PROTHROMBIN TIME: 17.5 SECONDS (ref 11.6–14.5)
RBC # BLD AUTO: 5.51 MILLION/UL (ref 3.88–5.62)
RBC MORPH BLD: NORMAL
SODIUM SERPL-SCNC: 143 MMOL/L (ref 135–147)
TRIGL SERPL-MCNC: 73 MG/DL
WBC # BLD AUTO: 6.21 THOUSAND/UL (ref 4.31–10.16)

## 2023-10-18 PROCEDURE — 80053 COMPREHEN METABOLIC PANEL: CPT

## 2023-10-18 PROCEDURE — 85610 PROTHROMBIN TIME: CPT

## 2023-10-18 PROCEDURE — 80061 LIPID PANEL: CPT

## 2023-10-18 PROCEDURE — 36415 COLL VENOUS BLD VENIPUNCTURE: CPT

## 2023-10-18 PROCEDURE — 85730 THROMBOPLASTIN TIME PARTIAL: CPT

## 2023-10-18 PROCEDURE — 85025 COMPLETE CBC W/AUTO DIFF WBC: CPT

## 2023-10-20 ENCOUNTER — TELEPHONE (OUTPATIENT)
Dept: FAMILY MEDICINE CLINIC | Facility: OTHER | Age: 68
End: 2023-10-20

## 2023-10-20 NOTE — TELEPHONE ENCOUNTER
----- Message from Janet Quintanilla MD sent at 10/18/2023  4:14 PM EDT -----  Attempted to call patient to discuss the results of his serology. CMP concerning for mild hypercalcemia no evidence of hypokalemia or kidney injury. CBC, concerning for moderate thrombocytopenia with polycythemia. INR mildly elevated likely secondary to impaired hepatic synthetic function. if patient is symptomatic of hypercalcemia or has any signs of bleeding, he should proceed to emergency department for urgent repeat serology. I will re-attempt to call him again tomorrow.

## 2023-10-20 NOTE — TELEPHONE ENCOUNTER
Janet Quintanilla MD   I have reattempted to contact Mr. Arie Mcgee this am.  He did not anscwer his phone, left message to return call. I have also attempted to call his wife, the listed alternate contact but got a message stating the number is not in service. Repeat CMP and ionized calcium ordered. Repeat CBC with platelets as well. Referral placed for Dr. Crystal Willingham, transplant hepatology.

## 2024-02-05 ENCOUNTER — OFFICE VISIT (OUTPATIENT)
Dept: FAMILY MEDICINE CLINIC | Facility: OTHER | Age: 69
End: 2024-02-05
Payer: COMMERCIAL

## 2024-02-05 VITALS
OXYGEN SATURATION: 88 % | HEART RATE: 92 BPM | WEIGHT: 156 LBS | BODY MASS INDEX: 24.48 KG/M2 | SYSTOLIC BLOOD PRESSURE: 142 MMHG | TEMPERATURE: 97.6 F | HEIGHT: 67 IN | RESPIRATION RATE: 18 BRPM | DIASTOLIC BLOOD PRESSURE: 80 MMHG

## 2024-02-05 DIAGNOSIS — R09.81 STUFFY NOSE: ICD-10-CM

## 2024-02-05 DIAGNOSIS — R05.9 COUGH, UNSPECIFIED TYPE: ICD-10-CM

## 2024-02-05 DIAGNOSIS — J40 BRONCHITIS: Primary | ICD-10-CM

## 2024-02-05 LAB
SARS-COV-2 AG UPPER RESP QL IA: NEGATIVE
VALID CONTROL: NORMAL

## 2024-02-05 PROCEDURE — 99213 OFFICE O/P EST LOW 20 MIN: CPT

## 2024-02-05 PROCEDURE — 87811 SARS-COV-2 COVID19 W/OPTIC: CPT

## 2024-02-05 RX ORDER — FLUTICASONE PROPIONATE 50 MCG
1 SPRAY, SUSPENSION (ML) NASAL DAILY
Qty: 9.9 ML | Refills: 0 | Status: SHIPPED | OUTPATIENT
Start: 2024-02-05

## 2024-02-05 RX ORDER — ALBUTEROL SULFATE 90 UG/1
2 AEROSOL, METERED RESPIRATORY (INHALATION) EVERY 4 HOURS PRN
Qty: 18 G | Refills: 0 | Status: SHIPPED | OUTPATIENT
Start: 2024-02-05

## 2024-02-05 RX ORDER — PREDNISONE 20 MG/1
40 TABLET ORAL DAILY
Qty: 10 TABLET | Refills: 0 | Status: SHIPPED | OUTPATIENT
Start: 2024-02-05 | End: 2024-02-10

## 2024-02-05 NOTE — PROGRESS NOTES
Assessment/Plan:  68-year-old male presents with chief complaint of cough and congestion persisting for 2 weeks.  Will treat as bronchitis with oral steroids as wheezing heard on exam. Does not appear currently infectious, but more likely recovering from a viral illness. I expressed to the patient that I would like for him to come back for close follow-up later this week. Albuterol refilled.    Concern for possible noninfectious underlying etiology for his wheezing as he denies a history of asthma or COPD in the setting of being a remote smoker (quit over 30 years ago). Reminded patient to set up an appointment with cardiology and schedule echocardiogram to follow-up on his past medical history diagnosis of pulmonary hypertension. May be worth to get PFTs if continues to have recurrent wheezing episodes and low oxygenation.    COVID negative in office.      No problem-specific Assessment & Plan notes found for this encounter.       Diagnoses and all orders for this visit:    Bronchitis  -     predniSONE 20 mg tablet; Take 2 tablets (40 mg total) by mouth daily for 5 days  -     albuterol (Ventolin HFA) 90 mcg/act inhaler; Inhale 2 puffs every 4 (four) hours as needed for wheezing (cough)    Cough, unspecified type  -     POCT Rapid Covid Ag  -     fluticasone (FLONASE) 50 mcg/act nasal spray; 1 spray into each nostril daily    Stuffy nose  -     fluticasone (FLONASE) 50 mcg/act nasal spray; 1 spray into each nostril daily          Subjective:      Patient ID: Eduardo Joiner is a 68 y.o. male.    He presents today with chief concern concern of his congestion and cough that has been persisting for the past 2 weeks despite taking a lot of Mucinex.  He denies any recent sick contacts and says that he usually just stays at home alone.  He denies any new changes to his routine or travel or new happenings in his life over the last couple of weeks.  He denies hemoptysis.  Overall, he reports that he feels fine, he just  has this cough and congestion that seems to not go away.  He denies that it is worsening but it is persisting.  Patient had similar episode a couple months ago.  He denies smoking currently and has quit smoking for over 30 years.  He does say that he started smoking as a teenager.  He denies any history of asthma or COPD.  When asked about pulmonary hypertension diagnosis in his chart he thinks it might be secondary to hepatitis C.  He says that back in [facility] in [place] he was seen for this (pulmonary hypertension ) and was eventually prescribed oxygen -he estimates this happened about 3 years ago, but he never used the oxygen. Interestingly, when I inquired about if he ever had liver cancer or was on a liver transplant list he denied this being true or remembering this even when I told him this was listed on his history from his chart.     Overall, his current symptoms do not cause him pain or that much distress.  His symptoms do not interfere with his sleep.  He does have an albuterol inhaler from previous prescription but reports he has only had to use it maybe once or twice for the past 2 weeks.        The following portions of the patient's history were reviewed and updated as appropriate: allergies, current medications, past family history, past medical history, past social history, past surgical history, and problem list.    Review of Systems   Constitutional:  Negative for activity change, appetite change, chills, fatigue and fever.   HENT:  Positive for congestion. Negative for ear pain, sinus pressure, sinus pain and sore throat.    Eyes:  Negative for pain and visual disturbance.   Respiratory:  Positive for cough, shortness of breath and wheezing.    Cardiovascular:  Negative for chest pain.   Gastrointestinal:  Negative for abdominal pain, constipation, diarrhea, nausea and vomiting.   Endocrine: Negative for polyuria.   Genitourinary:  Negative for difficulty urinating and dysuria.  "  Musculoskeletal:  Negative for myalgias.   Skin:  Negative for rash.   Allergic/Immunologic: Negative for environmental allergies and food allergies.   Neurological:  Negative for dizziness, light-headedness and headaches.   Hematological:  Does not bruise/bleed easily.   Psychiatric/Behavioral:  Negative for sleep disturbance.    All other systems reviewed and are negative.        Objective:      /80   Pulse 92   Temp 97.6 °F (36.4 °C)   Resp 18   Ht 5' 7\" (1.702 m)   Wt 70.8 kg (156 lb)   SpO2 (!) 88%   BMI 24.43 kg/m²          Physical Exam  Vitals and nursing note reviewed.   Constitutional:       General: He is not in acute distress.     Appearance: Normal appearance. He is not ill-appearing.   HENT:      Head: Normocephalic and atraumatic.      Right Ear: Ear canal and external ear normal. A middle ear effusion is present. Tympanic membrane is not perforated or erythematous.      Left Ear: Ear canal and external ear normal. A middle ear effusion is present. Tympanic membrane is not perforated or erythematous.      Nose: Congestion present.      Mouth/Throat:      Mouth: Mucous membranes are moist.      Pharynx: No posterior oropharyngeal erythema.   Eyes:      Conjunctiva/sclera: Conjunctivae normal.   Cardiovascular:      Rate and Rhythm: Normal rate and regular rhythm.      Pulses: Normal pulses.      Heart sounds: Normal heart sounds.   Pulmonary:      Effort: Pulmonary effort is normal. No respiratory distress.      Breath sounds: Wheezing present.   Skin:     General: Skin is warm and dry.   Neurological:      Mental Status: He is alert and oriented to person, place, and time.   Psychiatric:         Mood and Affect: Mood normal.         Behavior: Behavior normal.               Tobacco and Toxic Substance Assessment and Intervention:     Tobacco use screening performed          "

## 2024-02-06 ENCOUNTER — RA CDI HCC (OUTPATIENT)
Dept: OTHER | Facility: HOSPITAL | Age: 69
End: 2024-02-06

## 2024-02-09 ENCOUNTER — OFFICE VISIT (OUTPATIENT)
Dept: FAMILY MEDICINE CLINIC | Facility: OTHER | Age: 69
End: 2024-02-09
Payer: COMMERCIAL

## 2024-02-09 VITALS
HEART RATE: 89 BPM | SYSTOLIC BLOOD PRESSURE: 140 MMHG | OXYGEN SATURATION: 97 % | DIASTOLIC BLOOD PRESSURE: 74 MMHG | WEIGHT: 152 LBS | HEIGHT: 67 IN | BODY MASS INDEX: 23.86 KG/M2 | RESPIRATION RATE: 16 BRPM | TEMPERATURE: 98.2 F

## 2024-02-09 DIAGNOSIS — I27.20 PULMONARY HYPERTENSION (HCC): ICD-10-CM

## 2024-02-09 DIAGNOSIS — R05.9 COUGH, UNSPECIFIED TYPE: ICD-10-CM

## 2024-02-09 DIAGNOSIS — J40 BRONCHITIS: Primary | ICD-10-CM

## 2024-02-09 PROCEDURE — 99214 OFFICE O/P EST MOD 30 MIN: CPT

## 2024-02-09 RX ORDER — AZITHROMYCIN 250 MG/1
TABLET, FILM COATED ORAL DAILY
Qty: 6 TABLET | Refills: 0 | Status: SHIPPED | OUTPATIENT
Start: 2024-02-09 | End: 2024-02-14

## 2024-02-09 NOTE — PROGRESS NOTES
Name: Eduardo Joiner      : 1955      MRN: [id number]  Encounter Provider: Evan Arnold MD  Encounter Date: 2024   Encounter department: [facility]    Assessment & Plan   Pt is a 68 Yom with PMH pulmonary HTN, thrombocytopenia, remote smoking history, who presents to clinic as f/u on prior episodes of dyspnea and wheezing. Pt endorses only mild improvement in symptoms with steroid burst. Pt endorses continued production of whitish sputum as well as cough, does state this is mildly improved since last visit but still incompletely resolved. Vitals improved at this visit, with appropriate saturation on room air. Auscultation reveals scattered inspiratory wheezes. Orders as below, pt advised to re-establish care for mgmt of pulmonary htn. Pt would likely benefit from PFTs, but given current concerns over respiratory illness results would likely not be indicative of true baseline at this time. RTC in one month for assessment of condition and additional testing/specialist coordination as required.     1. Bronchitis  -     XR chest pa & lateral; Future; Expected date: 2024  -     azithromycin (Zithromax) 250 mg tablet; Take 2 tablets (500 mg total) by mouth daily for 1 day, THEN 1 tablet (250 mg total) daily for 4 days.    2. Pulmonary hypertension (HCC)  Assessment & Plan:  During interview patient endorsed history of pulmonary hypertension, historically followed with [facility] for management.  Patient discontinued relationship with the pen, but has not established care with local pulmonologist or other specialist for this condition.  Patient was historically maintained on Opsumit 10mg daily for mgmt, patient self discontinued this medication for uncertain reasons.  Patient presents today complaining of dyspnea not significantly improved since his last visit several days ago.    Plan:  Echo ordered AWV , patient encouraged to complete this order  Review of records indicates  no prior PFT, not ordered at this time as patient has concomitant respiratory illness which would cloud interpretation  CXR ordered at this visit to assess for other etiologies of shortness of breath such as pneumonia, COPD, other infiltrative illnesses  Return to clinic in 1 month for reassessment    Orders:  -     XR chest pa & lateral; Future; Expected date: 02/09/2024  -     Ambulatory Referral to Pulmonology; Future    3. Cough, unspecified type           Subjective     68-year-old male returns for follow-up on congestion and cough and was seen earlier this week on 2/5.  Patient reports continuing congestion and cough productive of dirty white sputum.  He has been taking the prednisone 1 in the morning 1 at night and albuterol and Flonase which have been temporarily helpful.  Patient endorses symptoms only with exertion and has no symptoms at rest or with sleep.  Patient denies fevers nausea vomiting diarrhea or abdominal pain.  Review of Systems   Constitutional:  Negative for chills and fever.   HENT:  Negative for ear pain and sore throat.    Eyes:  Negative for pain and visual disturbance.   Respiratory:  Positive for cough. Negative for shortness of breath.    Cardiovascular:  Negative for chest pain and palpitations.   Gastrointestinal:  Negative for abdominal pain and vomiting.   Genitourinary:  Negative for dysuria and hematuria.   Musculoskeletal:  Negative for arthralgias and back pain.   Skin:  Negative for color change and rash.   Neurological:  Negative for seizures and syncope.   All other systems reviewed and are negative.    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    • Psoriasis    • Pulmonary HTN (HCC)    • Thrombocytopenia (HCC)      Past Surgical History:   Procedure Laterality Date   • CHOLECYSTECTOMY       Family History   Problem Relation Age of Onset   • Diabetes Mother    • Coronary artery disease Mother    • Stroke Father         Major strokes x2    • Diabetes Sister      Social History     Socioeconomic History   • Marital status: /Civil Union     Spouse name: None   • Number of children: None   • Years of education: None   • Highest education level: None   Occupational History   • Occupation: Disabled    Tobacco Use   • Smoking status: Former     Current packs/day: 0.00     Types: Cigarettes     Quit date:      Years since quittin.1   • Smokeless tobacco: Never   Substance and Sexual Activity   • Alcohol use: Not Currently     Comment: quit  - As per Jessica    • Drug use: Not Currently     Comment: He used intravenous drugs only once at age 17 - As per Eminence    • Sexual activity: None   Other Topics Concern   • None   Social History Narrative    · Most recent tobacco use screenin2019      · Do you currently or have you served in the [employer]:   No      · Were you activated, into active duty, as a member of the [employer] or as a Reservist:   No      · Sexual orientation:   Heterosexual     As per Eminence      Social Determinants of Health     Financial Resource Strain: Patient Declined (2023)    Overall Financial Resource Strain (CARDIA)    • Difficulty of Paying Living Expenses: Patient declined   Food Insecurity: Not on file   Transportation Needs: Patient Declined (2023)    PRAPARE - Transportation    • Lack of Transportation (Medical): Patient declined    • Lack of Transportation (Non-Medical): Patient declined   Physical Activity: Not on file   Stress: Not on file   Social  "Connections: Not on file   Intimate Partner Violence: Not on file   Housing Stability: Not on file     Current Outpatient Medications on File Prior to Visit   Medication Sig   • albuterol (Ventolin HFA) 90 mcg/act inhaler Inhale 2 puffs every 4 (four) hours as needed for wheezing (cough)   • benzonatate (TESSALON PERLES) 100 mg capsule Take 1 capsule (100 mg total) by mouth 3 (three) times a day as needed for cough   • fluticasone (FLONASE) 50 mcg/act nasal spray 1 spray into each nostril daily   • Diclofenac Sodium (VOLTAREN) 1 % Apply 2 g topically 4 (four) times a day (Patient not taking: Reported on 2/9/2024)     No Known Allergies  Immunization History   Administered Date(s) Administered   • COVID-19 Moderna Vac BIVALENT 12 Yr+ IM 0.5 ML 12/05/2022   • COVID-19 PFIZER VACCINE 0.3 ML IM 03/26/2021, 04/16/2021, 10/28/2021   • COVID-19 Pfizer mRNA vacc PF anthony-sucrose 12 yr and older (Comirnaty) 10/30/2023   • INFLUENZA 10/02/2012, 09/02/2015, 10/06/2017, 10/12/2018, 09/02/2020, 10/08/2021, 10/02/2022, 10/30/2023   • Pneumococcal Conjugate 13-Valent 05/25/2019   • Pneumococcal Polysaccharide PPV23 03/03/2017   • Tdap 03/27/2018       Objective     /74   Pulse 89   Temp 98.2 °F (36.8 °C)   Resp 16   Ht 5' 7\" (1.702 m)   Wt 68.9 kg (152 lb)   SpO2 97%   BMI 23.81 kg/m²     Physical Exam  Vitals and nursing note reviewed.   Constitutional:       General: He is not in acute distress.     Appearance: Normal appearance. He is not ill-appearing.   HENT:      Head: Normocephalic and atraumatic.      Right Ear: External ear normal.      Left Ear: External ear normal.      Nose: Nose normal.      Mouth/Throat:      Mouth: Mucous membranes are moist.      Pharynx: Oropharynx is clear.   Eyes:      General: No scleral icterus.     Extraocular Movements: Extraocular movements intact.      Conjunctiva/sclera: Conjunctivae normal.   Cardiovascular:      Rate and Rhythm: Normal rate and regular rhythm.      Pulses: " Normal pulses.      Heart sounds: Normal heart sounds. No murmur heard.     No friction rub. No gallop.   Pulmonary:      Effort: Pulmonary effort is normal.      Breath sounds: Wheezing present. No rhonchi or rales.      Comments: Scattered inspiratory wheezing   Abdominal:      General: Abdomen is flat.      Palpations: Abdomen is soft. There is no mass.      Tenderness: There is no abdominal tenderness. There is no guarding.   Musculoskeletal:         General: Normal range of motion.      Cervical back: Normal range of motion and neck supple.      Right lower leg: No edema.      Left lower leg: No edema.   Lymphadenopathy:      Cervical: No cervical adenopathy.   Skin:     General: Skin is warm and dry.      Capillary Refill: Capillary refill takes less than 2 seconds.   Neurological:      Mental Status: He is alert. Mental status is at baseline.   Psychiatric:         Mood and Affect: Mood normal.   Evan Arnold MD

## 2024-02-09 NOTE — ASSESSMENT & PLAN NOTE
During interview patient endorsed history of pulmonary hypertension, historically followed with [facility] for management.  Patient discontinued relationship with the pen, but has not established care with local pulmonologist or other specialist for this condition.  Patient was historically maintained on Opsumit 10mg daily for mgmt, patient self discontinued this medication for uncertain reasons.  Patient presents today complaining of dyspnea not significantly improved since his last visit several days ago.    Plan:  Echo ordered AWV 09/23, patient encouraged to complete this order  Review of records indicates no prior PFT, not ordered at this time as patient has concomitant respiratory illness which would cloud interpretation  CXR ordered at this visit to assess for other etiologies of shortness of breath such as pneumonia, COPD, other infiltrative illnesses  Return to clinic in 1 month for reassessment

## 2024-02-12 ENCOUNTER — APPOINTMENT (OUTPATIENT)
Dept: LAB | Facility: CLINIC | Age: 69
End: 2024-02-12
Payer: COMMERCIAL

## 2024-02-12 ENCOUNTER — HOSPITAL ENCOUNTER (OUTPATIENT)
Dept: RADIOLOGY | Facility: HOSPITAL | Age: 69
Discharge: HOME | End: 2024-02-12
Payer: COMMERCIAL

## 2024-02-12 ENCOUNTER — TELEPHONE (OUTPATIENT)
Dept: FAMILY MEDICINE CLINIC | Facility: OTHER | Age: 69
End: 2024-02-12

## 2024-02-12 DIAGNOSIS — I27.20 PULMONARY HYPERTENSION (HCC): ICD-10-CM

## 2024-02-12 DIAGNOSIS — J40 BRONCHITIS: ICD-10-CM

## 2024-02-12 DIAGNOSIS — E83.52 HYPERCALCEMIA: ICD-10-CM

## 2024-02-12 DIAGNOSIS — D69.6 PLATELETS DECREASED (HCC): ICD-10-CM

## 2024-02-12 LAB
ALBUMIN SERPL BCP-MCNC: 3.3 G/DL (ref 3.5–5)
ALP SERPL-CCNC: 66 U/L (ref 34–104)
ALT SERPL W P-5'-P-CCNC: 45 U/L (ref 7–52)
ANION GAP SERPL CALCULATED.3IONS-SCNC: 9 MMOL/L
AST SERPL W P-5'-P-CCNC: 41 U/L (ref 13–39)
BASOPHILS # BLD AUTO: 0.09 THOUSANDS/ÂΜL (ref 0–0.1)
BASOPHILS NFR BLD AUTO: 1 % (ref 0–1)
BILIRUB SERPL-MCNC: 3.99 MG/DL (ref 0.2–1)
BUN SERPL-MCNC: 14 MG/DL (ref 5–25)
CA-I BLD-SCNC: 1.21 MMOL/L (ref 1.12–1.32)
CALCIUM ALBUM COR SERPL-MCNC: 9.8 MG/DL (ref 8.3–10.1)
CALCIUM SERPL-MCNC: 9.2 MG/DL (ref 8.4–10.2)
CHLORIDE SERPL-SCNC: 102 MMOL/L (ref 96–108)
CO2 SERPL-SCNC: 32 MMOL/L (ref 21–32)
CREAT SERPL-MCNC: 0.89 MG/DL (ref 0.6–1.3)
EOSINOPHIL # BLD AUTO: 0.31 THOUSAND/ÂΜL (ref 0–0.61)
EOSINOPHIL NFR BLD AUTO: 3 % (ref 0–6)
ERYTHROCYTE [DISTWIDTH] IN BLOOD BY AUTOMATED COUNT: 15.9 % (ref 11.6–15.1)
GFR SERPL CREATININE-BSD FRML MDRD: 87 ML/MIN/1.73SQ M
GLUCOSE P FAST SERPL-MCNC: 121 MG/DL (ref 65–99)
HCT VFR BLD AUTO: 51.7 % (ref 36.5–49.3)
HGB BLD-MCNC: 18.3 G/DL (ref 12–17)
IMM GRANULOCYTES # BLD AUTO: 0.03 THOUSAND/UL (ref 0–0.2)
IMM GRANULOCYTES NFR BLD AUTO: 0 % (ref 0–2)
LYMPHOCYTES # BLD AUTO: 2.74 THOUSANDS/ÂΜL (ref 0.6–4.47)
LYMPHOCYTES NFR BLD AUTO: 30 % (ref 14–44)
MCH RBC QN AUTO: 32.3 PG (ref 26.8–34.3)
MCHC RBC AUTO-ENTMCNC: 35.4 G/DL (ref 31.4–37.4)
MCV RBC AUTO: 91 FL (ref 82–98)
MONOCYTES # BLD AUTO: 0.8 THOUSAND/ÂΜL (ref 0.17–1.22)
MONOCYTES NFR BLD AUTO: 9 % (ref 4–12)
NEUTROPHILS # BLD AUTO: 5.03 THOUSANDS/ÂΜL (ref 1.85–7.62)
NEUTS SEG NFR BLD AUTO: 57 % (ref 43–75)
NRBC BLD AUTO-RTO: 0 /100 WBCS
PLATELET # BLD AUTO: 94 THOUSANDS/UL (ref 149–390)
PLATELET BLD QL SMEAR: ABNORMAL
PMV BLD AUTO: 9 FL (ref 8.9–12.7)
POTASSIUM SERPL-SCNC: 4.1 MMOL/L (ref 3.5–5.3)
PROT SERPL-MCNC: 7.1 G/DL (ref 6.4–8.4)
RBC # BLD AUTO: 5.66 MILLION/UL (ref 3.88–5.62)
RBC MORPH BLD: NORMAL
SODIUM SERPL-SCNC: 143 MMOL/L (ref 135–147)
WBC # BLD AUTO: 9 THOUSAND/UL (ref 4.31–10.16)

## 2024-02-12 PROCEDURE — 36415 COLL VENOUS BLD VENIPUNCTURE: CPT

## 2024-02-12 PROCEDURE — 82330 ASSAY OF CALCIUM: CPT

## 2024-02-12 PROCEDURE — 80053 COMPREHEN METABOLIC PANEL: CPT

## 2024-02-12 PROCEDURE — 71046 X-RAY EXAM CHEST 2 VIEWS: CPT

## 2024-02-12 PROCEDURE — 85025 COMPLETE CBC W/AUTO DIFF WBC: CPT

## 2024-02-12 NOTE — TELEPHONE ENCOUNTER
----- Message from Mary Uriostegui MD sent at 2/12/2024  2:18 PM EST -----  Patient with stable erythrocytosis and thrombocytopenia.  Hypercalcemia has resolved.  Please schedule patient to be seen in office to discuss these results

## 2024-02-19 ENCOUNTER — HOSPITAL ENCOUNTER (OUTPATIENT)
Dept: NON INVASIVE DIAGNOSTICS | Facility: HOSPITAL | Age: 69
Discharge: HOME | End: 2024-02-19
Attending: STUDENT IN AN ORGANIZED HEALTH CARE EDUCATION/TRAINING PROGRAM
Payer: COMMERCIAL

## 2024-02-19 ENCOUNTER — OFFICE VISIT (OUTPATIENT)
Dept: FAMILY MEDICINE CLINIC | Facility: OTHER | Age: 69
End: 2024-02-19
Payer: COMMERCIAL

## 2024-02-19 VITALS
SYSTOLIC BLOOD PRESSURE: 140 MMHG | HEART RATE: 99 BPM | BODY MASS INDEX: 24.33 KG/M2 | WEIGHT: 155 LBS | HEIGHT: 67 IN | DIASTOLIC BLOOD PRESSURE: 70 MMHG

## 2024-02-19 VITALS
DIASTOLIC BLOOD PRESSURE: 70 MMHG | TEMPERATURE: 97.1 F | HEIGHT: 67 IN | OXYGEN SATURATION: 95 % | RESPIRATION RATE: 15 BRPM | BODY MASS INDEX: 24.39 KG/M2 | WEIGHT: 155.4 LBS | HEART RATE: 89 BPM | SYSTOLIC BLOOD PRESSURE: 140 MMHG

## 2024-02-19 DIAGNOSIS — I27.20 PULMONARY HYPERTENSION (HCC): ICD-10-CM

## 2024-02-19 DIAGNOSIS — D69.6 PLATELETS DECREASED (HCC): ICD-10-CM

## 2024-02-19 DIAGNOSIS — K74.60 HEPATIC CIRRHOSIS, UNSPECIFIED HEPATIC CIRRHOSIS TYPE, UNSPECIFIED WHETHER ASCITES PRESENT (HCC): Primary | ICD-10-CM

## 2024-02-19 DIAGNOSIS — I10 PRIMARY HYPERTENSION: ICD-10-CM

## 2024-02-19 LAB
AORTIC ROOT: 3.6 CM
APICAL FOUR CHAMBER EJECTION FRACTION: 53 %
ASCENDING AORTA: 3.4 CM
BSA FOR ECHO PROCEDURE: 1.81 M2
E WAVE DECELERATION TIME: 273 MS
E/A RATIO: 0.52
FRACTIONAL SHORTENING: 37 (ref 28–44)
INTERVENTRICULAR SEPTUM IN DIASTOLE (PARASTERNAL SHORT AXIS VIEW): 1.2 CM
INTERVENTRICULAR SEPTUM: 1.2 CM (ref 0.6–1.1)
LAAS-AP2: 15.7 CM2
LAAS-AP4: 17.1 CM2
LEFT ATRIUM SIZE: 3.3 CM
LEFT ATRIUM VOLUME (MOD BIPLANE): 40 ML
LEFT ATRIUM VOLUME INDEX (MOD BIPLANE): 22 ML/M2
LEFT INTERNAL DIMENSION IN SYSTOLE: 2.4 CM (ref 2.1–4)
LEFT VENTRICULAR INTERNAL DIMENSION IN DIASTOLE: 3.8 CM (ref 3.5–6)
LEFT VENTRICULAR POSTERIOR WALL IN END DIASTOLE: 1.1 CM
LEFT VENTRICULAR STROKE VOLUME: 39 ML
LVSV (TEICH): 39 ML
MV E'TISSUE VEL-SEP: 7 CM/S
MV PEAK A VEL: 0.94 M/S
MV PEAK E VEL: 49 CM/S
MV STENOSIS PRESSURE HALF TIME: 79 MS
MV VALVE AREA P 1/2 METHOD: 2.78
RA PRESSURE ESTIMATED: 5 MMHG
RIGHT ATRIUM AREA SYSTOLE A4C: 17.7 CM2
RIGHT VENTRICLE ID DIMENSION: 3.6 CM
RV PSP: 45 MMHG
SL CV LEFT ATRIUM LENGTH A2C: 5.2 CM
SL CV LV EF: 53
SL CV PED ECHO LEFT VENTRICLE DIASTOLIC VOLUME (MOD BIPLANE) 2D: 60 ML
SL CV PED ECHO LEFT VENTRICLE SYSTOLIC VOLUME (MOD BIPLANE) 2D: 21 ML
TR MAX PG: 40 MMHG
TR PEAK VELOCITY: 3.2 M/S
TRICUSPID ANNULAR PLANE SYSTOLIC EXCURSION: 2.6 CM
TRICUSPID VALVE PEAK REGURGITATION VELOCITY: 3.15 M/S

## 2024-02-19 PROCEDURE — 93306 TTE W/DOPPLER COMPLETE: CPT

## 2024-02-19 PROCEDURE — 99214 OFFICE O/P EST MOD 30 MIN: CPT | Performed by: STUDENT IN AN ORGANIZED HEALTH CARE EDUCATION/TRAINING PROGRAM

## 2024-02-19 PROCEDURE — 93306 TTE W/DOPPLER COMPLETE: CPT | Performed by: INTERNAL MEDICINE

## 2024-02-19 RX ORDER — LISINOPRIL 5 MG/1
5 TABLET ORAL DAILY
Qty: 30 TABLET | Refills: 0 | Status: CANCELLED | OUTPATIENT
Start: 2024-02-19

## 2024-02-19 RX ORDER — SPIRONOLACTONE 25 MG/1
25 TABLET ORAL DAILY
Qty: 30 TABLET | Refills: 0 | Status: SHIPPED | OUTPATIENT
Start: 2024-02-19

## 2024-02-19 NOTE — PROGRESS NOTES
Name: Eduardo Joiner      : 1955      MRN: [id number]  Encounter Provider: Mary Lee MD  Encounter Date: 2024   Encounter department: [facility]    Assessment & Plan     1. Hepatic cirrhosis, unspecified hepatic cirrhosis type, unspecified whether ascites present (HCC)    2. Primary hypertension  -     spironolactone (ALDACTONE) 25 mg tablet; Take 1 tablet (25 mg total) by mouth daily    3. Platelets decreased (HCC)    4. Pulmonary hypertension (HCC)      Patient presents for f/u multiple chronic medical conditions. ECHO today at 2 pm, will follow-up results.  Seeing pulmonology tomorrow for SOL and history of pulmonary hypertension, appreciate their recommendations. Scheduled to see transplant hepatology in April.     Hypertensive today.  Will start spironolactone 25 mg PO daily. RTC 3/15 for BP check.  Recommend repeat BMP at that time.          Subjective      HPI  Patient presents to discuss results of lab work.  Patient mildly hypercalcemic in October which improved with recent check 1 week ago.  Patient advised to stop OTC calcium channel supplementation.     Patient complains of intermittent shortness of breath which has been chronic for him.  He takes albuterol 4 times daily as this is how he believed it was meant to be taken.  I have advised him to take q4 hours as needed for shortness of breath.  Patient has follow-up with pulmonology tomorrow.      Patient scheduled for echocardiogram this afternoon for further evaluation of pulmonary hypertension.     Discussed polycythemia with patient.  This is most likely 2nd polycythemia in the setting of pulmonary hypertension and likely COPD as patient has 25 pack year smoking history.     Discussed thrombocytopenia with patient, etiology is most likely Cirrhosis.  MELD score today is 17.  Patient is scheduled to see hepatology on   Review of Systems   Constitutional:  Negative for chills and fever.   HENT:   "Negative for ear pain and sore throat.    Eyes:  Negative for pain and visual disturbance.   Respiratory:  Positive for shortness of breath. Negative for cough, choking and chest tightness.    Cardiovascular:  Negative for chest pain, palpitations and leg swelling.   Gastrointestinal:  Negative for abdominal pain, constipation and vomiting.   Genitourinary:  Negative for dysuria and hematuria.   Musculoskeletal:  Negative for arthralgias and back pain.   Skin:  Negative for color change and rash.   Neurological:  Negative for seizures and syncope.   All other systems reviewed and are negative.      Current Outpatient Medications on File Prior to Visit   Medication Sig    albuterol (Ventolin HFA) 90 mcg/act inhaler Inhale 2 puffs every 4 (four) hours as needed for wheezing (cough)    benzonatate (TESSALON PERLES) 100 mg capsule Take 1 capsule (100 mg total) by mouth 3 (three) times a day as needed for cough    fluticasone (FLONASE) 50 mcg/act nasal spray 1 spray into each nostril daily    Diclofenac Sodium (VOLTAREN) 1 % Apply 2 g topically 4 (four) times a day (Patient not taking: Reported on 2/9/2024)       Objective     /70   Pulse 89   Temp (!) 97.1 °F (36.2 °C)   Resp 15   Ht 5' 7\" (1.702 m)   Wt 70.5 kg (155 lb 6.4 oz)   SpO2 95%   BMI 24.34 kg/m²     Physical Exam  Constitutional:       General: He is not in acute distress.     Appearance: He is not toxic-appearing.   HENT:      Head: Normocephalic and atraumatic.      Nose: Nose normal.      Mouth/Throat:      Mouth: Mucous membranes are moist.      Pharynx: Oropharynx is clear.   Eyes:      Extraocular Movements: Extraocular movements intact.      Pupils: Pupils are equal, round, and reactive to light.   Cardiovascular:      Rate and Rhythm: Normal rate and regular rhythm.      Heart sounds: Murmur heard.      No friction rub. No gallop.   Pulmonary:      Effort: Pulmonary effort is normal.      Breath sounds: Wheezing present. No rhonchi or " rales.   Abdominal:      General: Abdomen is flat.      Palpations: Abdomen is soft.      Tenderness: There is no abdominal tenderness. There is no guarding or rebound.   Skin:     General: Skin is warm and dry.   Neurological:      Mental Status: He is alert.     Mary Lee MD

## 2024-02-20 ENCOUNTER — CONSULT (OUTPATIENT)
Dept: PULMONOLOGY | Facility: CLINIC | Age: 69
End: 2024-02-20
Payer: COMMERCIAL

## 2024-02-20 ENCOUNTER — TELEPHONE (OUTPATIENT)
Dept: FAMILY MEDICINE CLINIC | Facility: OTHER | Age: 69
End: 2024-02-20

## 2024-02-20 VITALS
SYSTOLIC BLOOD PRESSURE: 142 MMHG | HEART RATE: 88 BPM | DIASTOLIC BLOOD PRESSURE: 80 MMHG | BODY MASS INDEX: 24.33 KG/M2 | TEMPERATURE: 100.2 F | OXYGEN SATURATION: 94 % | HEIGHT: 67 IN | WEIGHT: 155 LBS

## 2024-02-20 DIAGNOSIS — Z87.891 EX-SMOKER: Primary | ICD-10-CM

## 2024-02-20 DIAGNOSIS — I27.20 PULMONARY HYPERTENSION (HCC): ICD-10-CM

## 2024-02-20 PROCEDURE — 99205 OFFICE O/P NEW HI 60 MIN: CPT | Performed by: STUDENT IN AN ORGANIZED HEALTH CARE EDUCATION/TRAINING PROGRAM

## 2024-02-20 NOTE — TELEPHONE ENCOUNTER
----- Message from Mary Uriostegui MD sent at 2/20/2024  1:49 PM EST -----  Please call and inform patient that Echo overall looks good.  There are some changes consistent with pulmonary hypertension which was expected.  We can discuss more at appointment in march.

## 2024-02-20 NOTE — PROGRESS NOTES
Pulmonary Outpatient Consultation Note   Eduardo Joiner 68 y.o. male MRN: [id number]  2/20/2024    Referring provider:   Evan Arnold Md  [address] [facility]  [place] [address]     Reason for Consultation:  pulmonary hypertension     No problem-specific Assessment & Plan notes found for this encounter.      Assessment:    Portopulmonary hypertension used to follow at [facility] was on Opsumit, self discontinued for unclear reasons, PA pressures were 53/18 with an PAP of 34, wedge was 14 on RHC in 2019 and he was on macitentan and sildenafil. Discussing with patient he does not recall when he was on the PH meds. He recalls going to [facility] then [facility] then [facility].  He is interested in going back on these medications now.  It does not sound like he is any worse truthfully off of these medications compared to 2019.  Reviewing progress note from cardiology at that time, he has very similar symptoms, able to perform most of his own activities but does occasionally get dyspnea.  Hepatitis C with cirrhosis complicated by hepatocellular carcinoma  Recent bronchitis per PCP notes  Hepatic cirrhosis with MELD 17 and going to see hepatology, per review also has portal hypertension  Hypertension now on aldactone since yesterday   Ex smoker quit 25 years ago    Plan:    As the patient used to have pulmonary hypertension medications and now discontinued, it has been several years since his previous testing.  We will need to redo this testing  Obtain PFTs with DLCO and 6-minute walk test  Obtain CT chest with HRCT protocol  Obtain BNP, FELIZ  Obtain polysomnography  Obtain VQ scan, although this can be delayed until PFTs are performed  Ultimately he needs a right heart catheterization.  I will reach out to cardiology on how to order this.  I do not see an order in epic  Discussed that he may require resuming some of his pulmonary hypertension medications, and he is okay with that  Follow-up in 3 months  I have spent a total  time of 60 minutes on 02/20/24 in caring for this patient including Diagnostic results, Prognosis, Risks and benefits of tx options, Instructions for management, Patient and family education, Importance of tx compliance, Risk factor reductions, Impressions, Counseling / Coordination of care, Documenting in the medical record, Reviewing / ordering tests, medicine, procedures  , Obtaining or reviewing history  , and Communicating with other healthcare professionals .      History of Present Illness   HPI:    68-year-old gentleman referred to pulmonary due to pulmonary hypertension.  He has a past medical history of portal pulmonary hypertension, hep C with cirrhosis and hepatocellular carcinoma, ex-smoker, was initially seen at [place] and then [facility] and then [facility] with liver transplant team.  Unclear where he has been from 2019 until now.    Discussed with the patient he says he does not recall being on pulmonary hypertension medications although there is evidence of a right heart catheterization where he was on macitentan and sildenafil.    Patient currently is retired, used to work as a  and still does occasionally around his house.  His wife is retired as well, he does not exercise, able to walk up a flight of stairs but does occasionally get dyspnea.  No problems bending over, no orthopnea or PND, occasional lightheadedness but denies any syncope or palpitations and has no edema.    RHC in March 2019 showed the following hemodynamics: RA 5, RV 50/8, PA 53/18 (34), PCW 14; CO 6.74/CI 3.7; PVR 3 (on macitentan and sildenafil)     PAH specific medication include Macrodantin and cannot refill, there is a pharmacy note that indicates bosentan in 2019    Review of Systems   Constitutional:  Positive for activity change. Negative for unexpected weight change.   HENT: Negative.     Eyes: Negative.    Respiratory:  Positive for apnea, cough and shortness of breath.    Cardiovascular:   Negative for chest pain and leg swelling.   Gastrointestinal:  Positive for abdominal distention.   Endocrine: Negative.    Genitourinary: Negative.    Musculoskeletal:  Positive for joint swelling.   Allergic/Immunologic: Negative.    Neurological: Negative.    Hematological: Negative.    Psychiatric/Behavioral: Negative.           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    • Psoriasis    • Pulmonary HTN (HCC)    • Thrombocytopenia (HCC)      Past Surgical History:   Procedure Laterality Date   • CHOLECYSTECTOMY       Family History   Problem Relation Age of Onset   • Diabetes Mother    • Coronary artery disease Mother    • Stroke Father         Major strokes x2    • Diabetes Sister        Occupational History: Was a  retired    Social History     Tobacco Use   Smoking Status Former   • Current packs/day: 0.00   • Types: Cigarettes   • Quit date:    • Years since quittin.1   Smokeless Tobacco Never       Meds/Allergies     Current Outpatient Medications:   •  albuterol (Ventolin HFA) 90 mcg/act inhaler, Inhale 2 puffs every 4 (four) hours as needed for wheezing (cough), Disp: 18 g, Rfl: 0  •  benzonatate (TESSALON PERLES) 100 mg capsule, Take 1 capsule (100 mg total) by mouth 3 (three) times a day as needed for cough, Disp: 20 capsule, Rfl: 0  •  fluticasone (FLONASE) 50 mcg/act nasal spray, 1 spray into each nostril daily, Disp: 9.9 mL, Rfl: 0  •  spironolactone (ALDACTONE) 25 mg tablet, Take 1 tablet (25 mg total) by  "mouth daily, Disp: 30 tablet, Rfl: 0  •  Diclofenac Sodium (VOLTAREN) 1 %, Apply 2 g topically 4 (four) times a day (Patient not taking: Reported on 2/9/2024), Disp: 150 g, Rfl: 3  No Known Allergies    Vitals: Blood pressure 142/80, pulse 88, temperature 100.2 °F (37.9 °C), height 5' 7\" (1.702 m), weight 70.3 kg (155 lb), SpO2 94%., Body mass index is 24.28 kg/m². Oxygen Therapy  SpO2: 94 %  Oxygen Therapy: None (Room air)    Physical Exam:    GEN: alert and oriented x 3, pleasant and cooperative   HEENT:  Normocephalic, atraumatic, anicteric  NECK: Mild JVD   HEART: Rate, normal S1 and S2  LUNGS: Fine crackles at the bases R>L, no dullness to percussion   ABDOMEN:  Normoactive bowel sounds, soft, no tenderness, no distention  EXTREMITIES: peripheral pulses palpable; no edema  NEURO: no gross focal findings; cranial nerves grossly intact   SKIN:  Dry, intact, warm to touch    Labs: I have personally reviewed pertinent lab results.  No results found for: \"IGE\"    Imaging and other studies: I have personally reviewed pertinent films in PACS    Pulmonary function testing:  NA    Other Studies: I have personally reviewed pertinent films in PACS    Dacia Healy MD  Pulmonary and Critical Care Medicine     "

## 2024-02-20 NOTE — PATIENT INSTRUCTIONS
It was a pleasure seeing you today!    Obtain blood work  Obtain CT chest  Obtain VQ scan  Obtain sleep study  Obtain PFTs  I will figure out how to order a right heart catheterization  Follow up in 3 months     Dacia Healy MD  Pulmonary and Critical Care Medicine

## 2024-02-26 ENCOUNTER — HOSPITAL ENCOUNTER (OUTPATIENT)
Dept: RADIOLOGY | Facility: HOSPITAL | Age: 69
Discharge: HOME | End: 2024-02-26
Attending: STUDENT IN AN ORGANIZED HEALTH CARE EDUCATION/TRAINING PROGRAM
Payer: COMMERCIAL

## 2024-02-26 ENCOUNTER — HOSPITAL ENCOUNTER (OUTPATIENT)
Dept: NUCLEAR MEDICINE | Facility: HOSPITAL | Age: 69
Discharge: HOME | End: 2024-02-26
Attending: STUDENT IN AN ORGANIZED HEALTH CARE EDUCATION/TRAINING PROGRAM
Payer: COMMERCIAL

## 2024-02-26 DIAGNOSIS — I27.20 PULMONARY HYPERTENSION (HCC): ICD-10-CM

## 2024-02-26 PROCEDURE — 71046 X-RAY EXAM CHEST 2 VIEWS: CPT

## 2024-02-26 PROCEDURE — G1004 CDSM NDSC: HCPCS

## 2024-02-26 PROCEDURE — A9558 XE133 XENON 10MCI: HCPCS

## 2024-02-26 PROCEDURE — A9540 TC99M MAA: HCPCS

## 2024-02-26 PROCEDURE — 78582 LUNG VENTILAT&PERFUS IMAGING: CPT

## 2024-02-27 ENCOUNTER — HOSPITAL ENCOUNTER (OUTPATIENT)
Dept: CT IMAGING | Facility: HOSPITAL | Age: 69
Discharge: HOME | End: 2024-02-27
Attending: STUDENT IN AN ORGANIZED HEALTH CARE EDUCATION/TRAINING PROGRAM
Payer: COMMERCIAL

## 2024-02-27 DIAGNOSIS — I27.20 PULMONARY HYPERTENSION (HCC): ICD-10-CM

## 2024-02-27 DIAGNOSIS — G47.19 EXCESSIVE DAYTIME SLEEPINESS: ICD-10-CM

## 2024-02-27 DIAGNOSIS — I10 HYPERTENSION, UNSPECIFIED TYPE: Primary | ICD-10-CM

## 2024-02-27 PROCEDURE — 71250 CT THORAX DX C-: CPT

## 2024-02-27 PROCEDURE — G1004 CDSM NDSC: HCPCS

## 2024-02-29 ENCOUNTER — HOSPITAL ENCOUNTER (OUTPATIENT)
Dept: PULMONOLOGY | Facility: HOSPITAL | Age: 69
End: 2024-02-29
Attending: STUDENT IN AN ORGANIZED HEALTH CARE EDUCATION/TRAINING PROGRAM
Payer: COMMERCIAL

## 2024-02-29 DIAGNOSIS — I27.20 PULMONARY HYPERTENSION (HCC): ICD-10-CM

## 2024-02-29 PROCEDURE — 94729 DIFFUSING CAPACITY: CPT | Performed by: INTERNAL MEDICINE

## 2024-02-29 PROCEDURE — 94726 PLETHYSMOGRAPHY LUNG VOLUMES: CPT | Performed by: INTERNAL MEDICINE

## 2024-02-29 PROCEDURE — 94060 EVALUATION OF WHEEZING: CPT

## 2024-02-29 PROCEDURE — 94618 PULMONARY STRESS TESTING: CPT | Performed by: INTERNAL MEDICINE

## 2024-02-29 PROCEDURE — 94726 PLETHYSMOGRAPHY LUNG VOLUMES: CPT

## 2024-02-29 PROCEDURE — 94060 EVALUATION OF WHEEZING: CPT | Performed by: INTERNAL MEDICINE

## 2024-02-29 PROCEDURE — 94729 DIFFUSING CAPACITY: CPT

## 2024-02-29 PROCEDURE — 94618 PULMONARY STRESS TESTING: CPT

## 2024-02-29 RX ORDER — ALBUTEROL SULFATE 2.5 MG/3ML
2.5 SOLUTION RESPIRATORY (INHALATION) ONCE
Status: COMPLETED | OUTPATIENT
Start: 2024-02-29 | End: 2024-02-29

## 2024-02-29 RX ADMIN — ALBUTEROL SULFATE 2.5 MG: 2.5 SOLUTION RESPIRATORY (INHALATION) at 17:47

## 2024-03-05 ENCOUNTER — TELEPHONE (OUTPATIENT)
Dept: CARDIOLOGY CLINIC | Facility: CLINIC | Age: 69
End: 2024-03-05

## 2024-03-05 NOTE — TELEPHONE ENCOUNTER
Pulmonary    Notified by staff that patient is refusing right heart catheterization.  I called him and discussed.  He feels like he does not need the right heart catheterization.  I told him that given his previous history being treated at [facility] and on dual medications for pulmonary hypertension, it is advisable to undergo a right heart catheterization to get accurate and updated pressures.    Patient states he would like to hold off and not pursue further workup for now.  He has already completed a chest x-ray, VQ scan, HRCT, echocardiogram and PFT further workup of pulmonary hypertension.    Going forward as of now we will hold off on further workup.  No treatment offered for pulmonary hypertension.  He has an appointment with me in June and we can rediscuss    Dacia Healy MD  Pulmonary and Critical Care Medicine

## 2024-03-05 NOTE — TELEPHONE ENCOUNTER
"Called to get patient scheduled for Right Heart Cath and patient declined to schedule saying he don't feel like he needs this test.     Informed patient I will let  know of his decision.     Forwarding to .     Thanks,  Rahel \"Jolie\" David     "

## 2024-03-12 DIAGNOSIS — I10 PRIMARY HYPERTENSION: ICD-10-CM

## 2024-03-12 RX ORDER — SPIRONOLACTONE 25 MG/1
25 TABLET ORAL DAILY
Qty: 30 TABLET | Refills: 0 | Status: SHIPPED | OUTPATIENT
Start: 2024-03-12 | End: 2024-03-15

## 2024-03-15 ENCOUNTER — OFFICE VISIT (OUTPATIENT)
Dept: FAMILY MEDICINE CLINIC | Facility: OTHER | Age: 69
End: 2024-03-15
Payer: COMMERCIAL

## 2024-03-15 ENCOUNTER — APPOINTMENT (OUTPATIENT)
Dept: LAB | Facility: CLINIC | Age: 69
End: 2024-03-15
Payer: COMMERCIAL

## 2024-03-15 VITALS
OXYGEN SATURATION: 93 % | BODY MASS INDEX: 24.33 KG/M2 | WEIGHT: 155 LBS | RESPIRATION RATE: 16 BRPM | HEART RATE: 80 BPM | TEMPERATURE: 98 F | DIASTOLIC BLOOD PRESSURE: 80 MMHG | HEIGHT: 67 IN | SYSTOLIC BLOOD PRESSURE: 150 MMHG

## 2024-03-15 DIAGNOSIS — I10 PRIMARY HYPERTENSION: Primary | ICD-10-CM

## 2024-03-15 DIAGNOSIS — I10 PRIMARY HYPERTENSION: ICD-10-CM

## 2024-03-15 LAB
ANION GAP SERPL CALCULATED.3IONS-SCNC: 4 MMOL/L (ref 4–13)
BUN SERPL-MCNC: 11 MG/DL (ref 5–25)
CALCIUM SERPL-MCNC: 9.1 MG/DL (ref 8.4–10.2)
CHLORIDE SERPL-SCNC: 107 MMOL/L (ref 96–108)
CO2 SERPL-SCNC: 27 MMOL/L (ref 21–32)
CREAT SERPL-MCNC: 0.81 MG/DL (ref 0.6–1.3)
GFR SERPL CREATININE-BSD FRML MDRD: 90 ML/MIN/1.73SQ M
GLUCOSE P FAST SERPL-MCNC: 84 MG/DL (ref 65–99)
POTASSIUM SERPL-SCNC: 4.2 MMOL/L (ref 3.5–5.3)
SODIUM SERPL-SCNC: 138 MMOL/L (ref 135–147)

## 2024-03-15 PROCEDURE — G2211 COMPLEX E/M VISIT ADD ON: HCPCS

## 2024-03-15 PROCEDURE — 99213 OFFICE O/P EST LOW 20 MIN: CPT

## 2024-03-15 PROCEDURE — 80048 BASIC METABOLIC PNL TOTAL CA: CPT

## 2024-03-15 PROCEDURE — 36415 COLL VENOUS BLD VENIPUNCTURE: CPT

## 2024-03-15 RX ORDER — ATORVASTATIN CALCIUM 10 MG/1
10 TABLET, FILM COATED ORAL DAILY
Qty: 90 TABLET | Refills: 0 | Status: CANCELLED | OUTPATIENT
Start: 2024-03-15 | End: 2024-06-13

## 2024-03-15 RX ORDER — SPIRONOLACTONE 25 MG/1
50 TABLET ORAL DAILY
Qty: 30 TABLET | Refills: 0 | Status: SHIPPED | OUTPATIENT
Start: 2024-03-15

## 2024-03-15 NOTE — PROGRESS NOTES
Name: Eduardo Joiner      : 1955      MRN: [id number]  Encounter Provider: Evan Arnold MD  Encounter Date: 3/15/2024   Encounter department: [facility]    Assessment & Plan   Pt is a 68-year-old male with past medical history of pulmonary hypertension, cirrhosis, hypertension, who presents today for recheck of blood pressure medication tolerance.  Patient endorses he has been taking spironolactone 25 mg daily as prescribed, without any complications or intolerance.  Blood pressure reassessed at visit today and found to be elevated, patient to be escalated to 50 mg daily spironolactone with BMP to ensure no hyperkalemia.  Patient to return to office in 2 weeks for reassessment of blood pressure and medication tolerance/compliance.  Most recent labs, imaging, and plan of care discussed with patient who verbalized understanding, stated he did not wish to discuss any of these further at this point in time.    1. Primary hypertension  -     Basic metabolic panel; Future  -     spironolactone (ALDACTONE) 25 mg tablet; Take 2 tablets (50 mg total) by mouth daily           Subjective     Hypertension  This is a chronic problem. The current episode started more than 1 month ago. The problem is unchanged. The problem is uncontrolled. Associated symptoms include shortness of breath. Pertinent negatives include no blurred vision, chest pain, headaches, neck pain, palpitations or sweats. There are no associated agents to hypertension. Risk factors for coronary artery disease include male gender. Past treatments include nothing. The current treatment provides mild improvement. There are no compliance problems.  There is no history of angina, kidney disease, CVA or PVD.     Review of Systems   Constitutional:  Negative for chills and fever.   HENT:  Negative for ear pain and sore throat.    Eyes:  Negative for blurred vision, pain and visual disturbance.   Respiratory:  Positive for shortness  of breath. Negative for cough.    Cardiovascular:  Negative for chest pain and palpitations.   Gastrointestinal:  Negative for abdominal pain and vomiting.   Genitourinary:  Negative for dysuria and hematuria.   Musculoskeletal:  Negative for arthralgias, back pain and neck pain.   Skin:  Negative for color change and rash.   Neurological:  Negative for seizures, syncope and headaches.   All other systems reviewed and are negative.      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     Psoriasis     Pulmonary HTN (HCC)     Thrombocytopenia (HCC)      Past Surgical History:   Procedure Laterality Date    CHOLECYSTECTOMY       Family History   Problem Relation Age of Onset    Diabetes Mother     Coronary artery disease Mother     Stroke Father         Major strokes x2     Diabetes Sister      Social History     Socioeconomic History    Marital status: /Civil Union     Spouse name: None    Number of children: None    Years of education: None    Highest education level: None   Occupational History    Occupation: Disabled    Tobacco Use    Smoking status: Former     Current packs/day: 0.00     Types: Cigarettes     Quit date:      Years since quittin.2    Smokeless tobacco: Never   Substance and Sexual Activity    Alcohol use: Not Currently     Comment: quit  - As per Jessica     Drug use: Not Currently     Comment: He used intravenous drugs only once at age 17 - As per Jessica     Sexual activity: None   Other Topics Concern    None   Social History Narrative    · Most  recent tobacco use screenin2019      · Do you currently or have you served in the [place] Armed [employer]:   No      · Were you activated, into active duty, as a member of the [employer] or as a Reservist:   No      · Sexual orientation:   Heterosexual     As per [place]      Social Determinants of Health     Financial Resource Strain: Patient Declined (2023)    Overall Financial Resource Strain (CARDIA)     Difficulty of Paying Living Expenses: Patient declined   Food Insecurity: Not on file   Transportation Needs: Patient Declined (2023)    PRAPARE - Transportation     Lack of Transportation (Medical): Patient declined     Lack of Transportation (Non-Medical): Patient declined   Physical Activity: Not on file   Stress: Not on file   Social Connections: Not on file   Intimate Partner Violence: Not on file   Housing Stability: Not on file     Current Outpatient Medications on File Prior to Visit   Medication Sig    albuterol (Ventolin HFA) 90 mcg/act inhaler Inhale 2 puffs every 4 (four) hours as needed for wheezing (cough)    benzonatate (TESSALON PERLES) 100 mg capsule Take 1 capsule (100 mg total) by mouth 3 (three) times a day as needed for cough    fluticasone (FLONASE) 50 mcg/act nasal spray 1 spray into each nostril daily    [DISCONTINUED] spironolactone (ALDACTONE) 25 mg tablet take 1 tablet by mouth once daily    Diclofenac Sodium (VOLTAREN) 1 % Apply 2 g topically 4 (four) times a day (Patient not taking: Reported on 2024)     No Known Allergies  Immunization History   Administered Date(s) Administered    COVID-19 Moderna Vac BIVALENT 12 Yr+ IM 0.5 ML 2022    COVID-19 PFIZER VACCINE 0.3 ML IM 2021, 2021, 10/28/2021    COVID-19 Pfizer mRNA vacc PF anthony-sucrose 12 yr and older (Comirnaty) 10/30/2023    INFLUENZA 10/02/2012, 2015, 10/06/2017, 10/12/2018, 2020, 10/08/2021, 10/02/2022, 10/30/2023    Pneumococcal Conjugate 13-Valent 2019    Pneumococcal  "Polysaccharide PPV23 03/03/2017    Tdap 03/27/2018       Objective     /80   Pulse 80   Temp 98 °F (36.7 °C)   Resp 16   Ht 5' 7\" (1.702 m)   Wt 70.3 kg (155 lb)   SpO2 93%   BMI 24.28 kg/m²     Physical Exam  Vitals and nursing note reviewed.   Constitutional:       General: He is not in acute distress.     Appearance: Normal appearance. He is not ill-appearing.   HENT:      Head: Normocephalic and atraumatic.      Right Ear: External ear normal.      Left Ear: External ear normal.      Nose: Nose normal.      Mouth/Throat:      Mouth: Mucous membranes are moist.      Pharynx: Oropharynx is clear.   Eyes:      General: No scleral icterus.     Extraocular Movements: Extraocular movements intact.      Conjunctiva/sclera: Conjunctivae normal.   Cardiovascular:      Rate and Rhythm: Normal rate and regular rhythm.      Pulses: Normal pulses.      Heart sounds: Murmur heard.      No friction rub. No gallop.   Pulmonary:      Effort: Pulmonary effort is normal.      Breath sounds: No rhonchi or rales.      Comments: Wheezing resolved   Abdominal:      General: Abdomen is flat.      Palpations: Abdomen is soft. There is no mass.      Tenderness: There is no abdominal tenderness. There is no guarding.   Musculoskeletal:         General: Normal range of motion.      Cervical back: Normal range of motion and neck supple.      Right lower leg: No edema.      Left lower leg: No edema.   Lymphadenopathy:      Cervical: No cervical adenopathy.   Skin:     General: Skin is warm and dry.      Capillary Refill: Capillary refill takes less than 2 seconds.   Neurological:      Mental Status: He is alert. Mental status is at baseline.   Psychiatric:         Mood and Affect: Mood normal.       Evan Arnold MD    "

## 2024-03-29 ENCOUNTER — OFFICE VISIT (OUTPATIENT)
Dept: URGENT CARE | Facility: CLINIC | Age: 69
End: 2024-03-29
Payer: COMMERCIAL

## 2024-03-29 VITALS
BODY MASS INDEX: 24.33 KG/M2 | WEIGHT: 155 LBS | TEMPERATURE: 97.3 F | HEART RATE: 104 BPM | OXYGEN SATURATION: 99 % | DIASTOLIC BLOOD PRESSURE: 85 MMHG | SYSTOLIC BLOOD PRESSURE: 169 MMHG | RESPIRATION RATE: 14 BRPM | HEIGHT: 67 IN

## 2024-03-29 DIAGNOSIS — S30.811A EXCORIATION OF GROIN, INITIAL ENCOUNTER: Primary | ICD-10-CM

## 2024-03-29 PROCEDURE — 99283 EMERGENCY DEPT VISIT LOW MDM: CPT

## 2024-03-29 PROCEDURE — 99213 OFFICE O/P EST LOW 20 MIN: CPT | Performed by: FAMILY MEDICINE

## 2024-03-29 NOTE — PROGRESS NOTES
[facility]        NAME: Eduardo Joiner is a 68 y.o. male  : 1955    MRN: [id number]  DATE: 2024  TIME: 3:30 PM    Assessment and Plan   Excoriation of groin, initial encounter [S30.017L]  1. Excoriation of groin, initial encounter              Patient Instructions     Excoriation present on the scrotum. Bleeding has stopped. No further treatment necessary. Recommend ED if bleeding returns, discharge, foul odor or pain presents. Follow up with PCP in 3-5 days if needed. Proceed to ER if symptoms worsen.    Chief Complaint     Chief Complaint   Patient presents with   • Testicle Injury     Possible scrotal injury occurred today.          History of Present Illness     Eduardo Joiner is a 68 y.o. male presenting to the office today complaining of scrotal bleeding. He notes that it began this am. He noticed it when he went to the bathroom. He states that it has taken all day to stop bleeding. He denies any discharge or pain present.     Review of Systems     Review of Systems   Constitutional:  Negative for chills and fever.   HENT:  Negative for congestion and sore throat.    Eyes:  Negative for discharge and itching.   Respiratory:  Negative for cough and wheezing.    Gastrointestinal:  Negative for abdominal pain, nausea and vomiting.   Genitourinary:  Negative for dysuria and flank pain.   Musculoskeletal:  Negative for arthralgias, myalgias and neck pain.   Skin:  Positive for wound. Negative for rash.   Allergic/Immunologic: Negative for food allergies.   Neurological:  Negative for light-headedness and headaches.   Psychiatric/Behavioral:  Negative for agitation. The patient is not nervous/anxious.        Current Medications       Current Outpatient Medications:   •  albuterol (Ventolin HFA) 90 mcg/act inhaler, Inhale 2 puffs every 4 (four) hours as needed for wheezing (cough), Disp: 18 g, Rfl: 0  •  benzonatate (TESSALON PERLES) 100 mg capsule, Take 1 capsule (100 mg total)  "by mouth 3 (three) times a day as needed for cough, Disp: 20 capsule, Rfl: 0  •  Diclofenac Sodium (VOLTAREN) 1 %, Apply 2 g topically 4 (four) times a day (Patient not taking: Reported on 2/9/2024), Disp: 150 g, Rfl: 3  •  fluticasone (FLONASE) 50 mcg/act nasal spray, 1 spray into each nostril daily, Disp: 9.9 mL, Rfl: 0  •  spironolactone (ALDACTONE) 25 mg tablet, Take 2 tablets (50 mg total) by mouth daily, Disp: 30 tablet, Rfl: 0    Current Allergies     Allergies as of 03/29/2024   • (No Known Allergies)            The following portions of the patient's history were reviewed and updated as appropriate: allergies, current medications, past family history, past medical history, past social history, past surgical history and problem list.     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    Objective     /85   Pulse 104   Temp (!) 97.3 °F (36.3 °C)   Resp 14   Ht 5' 7\" (1.702 m)   Wt 70.3 kg (155 lb)   SpO2 99%   BMI 24.28 kg/m²   No LMP for male patient.     Physical Exam     Physical Exam  Vitals reviewed.   Constitutional:       General: He is not in acute distress.     Appearance: Normal " appearance.   HENT:      Head: Normocephalic and atraumatic.   Eyes:      Extraocular Movements: Extraocular movements intact.      Conjunctiva/sclera: Conjunctivae normal.   Pulmonary:      Effort: Pulmonary effort is normal. No respiratory distress.   Skin:     General: Skin is warm and dry.             Comments: Small excoriation present with scab formation. No active bleeding at this time. No discharge or erythema.   Neurological:      General: No focal deficit present.      Mental Status: He is alert.   Psychiatric:         Mood and Affect: Mood normal.         Behavior: Behavior normal.         Thought Content: Thought content normal.         Judgment: Judgment normal.

## 2024-03-30 ENCOUNTER — HOSPITAL ENCOUNTER (EMERGENCY)
Facility: HOSPITAL | Age: 69
Discharge: HOME | End: 2024-03-30
Attending: STUDENT IN AN ORGANIZED HEALTH CARE EDUCATION/TRAINING PROGRAM
Payer: COMMERCIAL

## 2024-03-30 VITALS
SYSTOLIC BLOOD PRESSURE: 174 MMHG | HEART RATE: 100 BPM | DIASTOLIC BLOOD PRESSURE: 82 MMHG | OXYGEN SATURATION: 95 % | TEMPERATURE: 97.6 F | RESPIRATION RATE: 18 BRPM

## 2024-03-30 DIAGNOSIS — S31.33XA: Primary | ICD-10-CM

## 2024-03-30 PROCEDURE — 12001 RPR S/N/AX/GEN/TRNK 2.5CM/<: CPT | Performed by: STUDENT IN AN ORGANIZED HEALTH CARE EDUCATION/TRAINING PROGRAM

## 2024-03-30 PROCEDURE — 99284 EMERGENCY DEPT VISIT MOD MDM: CPT | Performed by: STUDENT IN AN ORGANIZED HEALTH CARE EDUCATION/TRAINING PROGRAM

## 2024-03-30 RX ORDER — LIDOCAINE HYDROCHLORIDE AND EPINEPHRINE 10; 10 MG/ML; UG/ML
1 INJECTION, SOLUTION INFILTRATION; PERINEURAL ONCE
Status: COMPLETED | OUTPATIENT
Start: 2024-03-30 | End: 2024-03-30

## 2024-03-30 RX ADMIN — LIDOCAINE HYDROCHLORIDE,EPINEPHRINE BITARTRATE 1 ML: 10; .01 INJECTION, SOLUTION INFILTRATION; PERINEURAL at 00:26

## 2024-03-30 NOTE — DISCHARGE INSTRUCTIONS
Keep the area clean and dry for 24 hours.  After that you can shower and pat dry gently.  Do not scratch the wound.  The suture will absorb on its own over time.

## 2024-03-30 NOTE — ED PROVIDER NOTES
History  Chief Complaint   Patient presents with    Testicle Pain     Patient states he has a hole in his balls      Patient is a 68-year-old male with a history of cirrhosis of the liver, hypertension, and thrombocytopenia secondary to his cirrhosis who presents with bleeding from his scrotum.  The patient went to an urgent care earlier today where they stopped the bleeding with direct pressure and then told him to go to the drugstore and buy some sort of glue to put on it.  He was unable to make it to the drugstore but then noticed increased bleeding when he got home.  He presents here with continued bleeding.  He denies any pain.  He denies any past history of similar things.  He is not on any anticoagulation.  He does have thrombocytopenia secondary to cirrhosis.  He denies any infection, any urinary symptoms such as urgency, burning, or frequency.  He does not have any testicular pain.  He denies any lightheadedness, dizziness, or any other symptoms.      Testicle Pain  Associated symptoms: no fever        Prior to Admission Medications   Prescriptions Last Dose Informant Patient Reported? Taking?   Diclofenac Sodium (VOLTAREN) 1 %  Self No No   Sig: Apply 2 g topically 4 (four) times a day   Patient not taking: Reported on 2024   albuterol (Ventolin HFA) 90 mcg/act inhaler  Self No No   Sig: Inhale 2 puffs every 4 (four) hours as needed for wheezing (cough)   benzonatate (TESSALON PERLES) 100 mg capsule  Self No No   Sig: Take 1 capsule (100 mg total) by mouth 3 (three) times a day as needed for cough   fluticasone (FLONASE) 50 mcg/act nasal spray  Self No No   Si spray into each nostril daily   spironolactone (ALDACTONE) 25 mg tablet   No No   Sig: Take 2 tablets (50 mg total) by mouth daily      Facility-Administered Medications: None       Past Medical History:   Diagnosis Date    Anemia     Chronic hepatitis C with cirrhosis (HCC)     and hepatocellular carcinoma    Cirrhosis (HCC)     Colitis      GERD (gastroesophageal reflux disease)     Heart disease     Hyperlipidemia     Hypertension     Hypoxemia     Kidney stones     Liver cancer (HCC)     currently in remission    Liver disease     on liver transplant list    Lower extremity edema     Osteopenia     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    E-Cigarette/Vaping     E-Cigarette/Vaping Substances     Social History     Tobacco Use    Smoking status: Former     Current packs/day: 0.00     Types: Cigarettes     Quit date:      Years since quittin.2    Smokeless tobacco: Never   Substance Use Topics    Alcohol use: Not Currently     Comment: quit  - As per Jessica     Drug use: Not Currently     Comment: He used intravenous drugs only once at age 17 - As per [place]        Review of Systems   Constitutional:  Negative for activity change, appetite change, chills and fever.   HENT: Negative.     Eyes: Negative.    Respiratory: Negative.     Cardiovascular: Negative.    Gastrointestinal: Negative.    Genitourinary:  Negative for difficulty urinating and testicular pain.        Bleeding from scrotum   Neurological: Negative.    Hematological:  Bruises/bleeds easily.   Psychiatric/Behavioral: Negative.         Physical Exam  Physical Exam  Constitutional:       General: He is not in acute distress.     Appearance: Normal appearance.   HENT:      Head: Normocephalic and atraumatic.      Nose: Nose normal.   Eyes:      Pupils: Pupils are equal, round, and reactive to light.   Cardiovascular:      Rate and Rhythm: Normal rate and regular rhythm.   Pulmonary:       Effort: Pulmonary effort is normal.      Breath sounds: Normal breath sounds.   Abdominal:      General: Abdomen is flat.      Palpations: Abdomen is soft.      Tenderness: There is no abdominal tenderness.   Genitourinary:     Penis: Normal.       Testes: Normal.      Comments: Patient has a pinpoint area on his scrotum with persistent capillary ooze that does not stop with direct pressure.  On my initial evaluation he had some adherent clot around the scrotum, but when this was removed he just has that tiny pinpoint lesion that tends to be bleeding.  Musculoskeletal:         General: Normal range of motion.      Cervical back: Normal range of motion and neck supple.   Skin:     General: Skin is warm and dry.      Capillary Refill: Capillary refill takes less than 2 seconds.   Neurological:      General: No focal deficit present.      Mental Status: He is alert and oriented to person, place, and time.   Psychiatric:         Mood and Affect: Mood normal.         Behavior: Behavior normal.         Thought Content: Thought content normal.         Vital Signs  ED Triage Vitals [03/30/24 0009]   Temperature Pulse Respirations Blood Pressure SpO2   97.6 °F (36.4 °C) 100 18 (!) 174/82 95 %      Temp Source Heart Rate Source Patient Position - Orthostatic VS BP Location FiO2 (%)   Tympanic Monitor Sitting Left arm --      Pain Score       --           Vitals:    03/30/24 0009   BP: (!) 174/82   Pulse: 100   Patient Position - Orthostatic VS: Sitting         Visual Acuity      ED Medications  Medications   lidocaine-epinephrine (XYLOCAINE/EPINEPHRINE) 1 %-1:100,000 injection 1 mL (1 mL Infiltration Given 3/30/24 0026)       Diagnostic Studies  Results Reviewed       None                   No orders to display              Procedures  Universal Protocol:  Consent: Verbal consent obtained.  Risks and benefits: risks, benefits and alternatives were discussed  Patient understanding: patient states understanding of the procedure  being performed  Patient identity confirmed: verbally with patient  Laceration repair    Date/Time: 3/30/2024 12:25 AM    Performed by: Katia Guerra MD  Authorized by: Katia Guerra MD  Location: scrotum.  Laceration length: 0.3 cm  Tendon involvement: none  Nerve involvement: none  Anesthesia: local infiltration    Anesthesia:  Local Anesthetic: lidocaine 1% with epinephrine  Anesthetic total: 1 mL    Sedation:  Patient sedated: no      Wound Dehiscence:  Superficial Wound Dehiscence: simple closure      Procedure Details:  Preparation: Patient was prepped and draped in the usual sterile fashion.  Irrigation solution: saline  Amount of cleaning: standard  Debridement: none  Degree of undermining: none  Wound skin closure material used: 5-0 chromic.  Number of sutures: 1  Technique: simple  Approximation: close  Approximation difficulty: simple  Dressing: bandage.  Patient tolerance: patient tolerated the procedure well with no immediate complications               ED Course                                             Medical Decision Making  Patient is a 68-year-old male with a history of cirrhosis and thrombocytopenia who presents with bleeding coming from an area on his anterior scrotum.  There is no signs of actual trauma, bruising, or excoriation.  He does state that he was scratching there because he was having some itchiness, but no discrete lesion is noted.  I used lidocaine with epinephrine to numb the area and then put 1 figure-of-eight stitch with an absorbable suture in the area to stop the bleeding.  Hemostasis was achieved with this measure.  He was monitored for several minutes afterwards to ensure that no further bleeding occurred.  Of note there is no tenderness in the testicles, no signs of infection or cellulitis, no penile lesions.  There is no other rash.  He did not have any abdominal pain or any other concerning features.  Patient was given urology follow-up and was given return precautions  as listed.  He was discharged in stable condition.    Risk  Prescription drug management.             Disposition  Final diagnoses:   Puncture wound of scrotum, initial encounter     Time reflects when diagnosis was documented in both MDM as applicable and the Disposition within this note       Time User Action Codes Description Comment    3/30/2024 12:27 AM Katia Guerra Add [S31.33XA] Puncture wound of scrotum, initial encounter           ED Disposition       ED Disposition   Discharge    Condition   Stable    Date/Time   Sat Mar 30, 2024 12:27 AM    Comment   Eduardo Joiner discharge to home/self care.                   Follow-up Information       Follow up With Specialties Details Why Contact Info Additional Information    Mary Uriostegui MD Family Medicine In 3 days As needed [address]  [facility] [address]  [phone]       [facility] Emergency Medicine In 1 day If symptoms worsen [address] [address]  [phone] [facility], [address] [address]    [facility] [place] [facility] In 1 week As needed [address]  [facility] [address]  [phone] [facility] [place], [address] [facility] [address], [place], [place], [address]   [phone]            Discharge Medication List as of 3/30/2024 12:29 AM        CONTINUE these medications which have NOT CHANGED    Details   albuterol (Ventolin HFA) 90 mcg/act inhaler Inhale 2 puffs every 4 (four) hours as needed for wheezing (cough), Starting Mon 2/5/2024, Normal      benzonatate (TESSALON PERLES) 100 mg capsule Take 1 capsule (100 mg total) by mouth 3 (three) times a day as needed for cough, Starting Mon 10/9/2023, Normal      Diclofenac Sodium (VOLTAREN) 1 % Apply 2 g topically 4 (four) times a day, Starting Wed 12/14/2022, Normal      fluticasone (FLONASE) 50 mcg/act nasal  spray 1 spray into each nostril daily, Starting Mon 2/5/2024, Normal      spironolactone (ALDACTONE) 25 mg tablet Take 2 tablets (50 mg total) by mouth daily, Starting Fri 3/15/2024, Normal             No discharge procedures on file.    PDMP Review       None            ED Provider  Electronically Signed by             Katia Guerra MD  03/30/24 1237

## 2024-04-10 ENCOUNTER — TELEPHONE (OUTPATIENT)
Dept: GASTROENTEROLOGY | Facility: CLINIC | Age: 69
End: 2024-04-10

## 2024-04-10 NOTE — TELEPHONE ENCOUNTER
LVM for patient regarding 4/18 appnt with Dr. Campos: Provider out on maternity leave - 4/18 appnt needs to be rescheduled, lvm for pt to call back and reschedule 4/18 appnt. with Dr. Campos or with one of the covering providers for Dr. Campos.

## 2024-04-12 ENCOUNTER — TELEPHONE (OUTPATIENT)
Dept: FAMILY MEDICINE CLINIC | Facility: OTHER | Age: 69
End: 2024-04-12

## 2024-04-12 DIAGNOSIS — J40 BRONCHITIS: ICD-10-CM

## 2024-04-12 DIAGNOSIS — R05.9 COUGH, UNSPECIFIED TYPE: ICD-10-CM

## 2024-04-12 DIAGNOSIS — R09.81 STUFFY NOSE: ICD-10-CM

## 2024-04-12 RX ORDER — ALBUTEROL SULFATE 90 UG/1
2 AEROSOL, METERED RESPIRATORY (INHALATION) EVERY 4 HOURS PRN
Qty: 18 G | Refills: 0 | Status: SHIPPED | OUTPATIENT
Start: 2024-04-12

## 2024-04-12 RX ORDER — FLUTICASONE PROPIONATE 50 MCG
1 SPRAY, SUSPENSION (ML) NASAL DAILY
Qty: 9.9 ML | Refills: 0 | Status: SHIPPED | OUTPATIENT
Start: 2024-04-12

## 2024-04-12 NOTE — TELEPHONE ENCOUNTER
Medication: azithromycin (Zithromax) 250 mg tablet    Dose/Frequency: Take 2 tablets (500 mg total) by mouth daily for 1 day, THEN 1 tablet (250 mg total) daily for 4 days.       Quantity: 18 g     Pharmacy: [id number] [place] [address] [place] [address]  Phone: [phone]  Fax: [phone]  [facility] #: --     Office:   [x] PCP/Provider -   [] Speciality/Provider -     Does the patient have enough for 3 days?   [] Yes   [x] No - Send as HP to POD    Please verify medication with patient.

## 2024-04-12 NOTE — TELEPHONE ENCOUNTER
Sent over prescription for flonase and albuterol. Pt also stated on the vm another medication he needed but the way he was spelling it I could not find it. I called him to verify. Pt didn't  so I left a detailed vm.

## 2024-04-12 NOTE — TELEPHONE ENCOUNTER
Cody this is Eduardo Joiner and I'm calling about refills on some medicines and one is a nasal spray which is Flonase and the other one is albuterol. My phone number is [phone]. Please call me back as soon as possible. Thank you very much. Robert.

## 2024-04-16 ENCOUNTER — OFFICE VISIT (OUTPATIENT)
Dept: FAMILY MEDICINE CLINIC | Facility: OTHER | Age: 69
End: 2024-04-16
Payer: COMMERCIAL

## 2024-04-16 VITALS
RESPIRATION RATE: 18 BRPM | TEMPERATURE: 98 F | BODY MASS INDEX: 24.01 KG/M2 | HEIGHT: 67 IN | DIASTOLIC BLOOD PRESSURE: 80 MMHG | OXYGEN SATURATION: 89 % | WEIGHT: 153 LBS | HEART RATE: 102 BPM | SYSTOLIC BLOOD PRESSURE: 142 MMHG

## 2024-04-16 DIAGNOSIS — J98.4 MIXED RESTRICTIVE AND OBSTRUCTIVE LUNG DISEASE  (HCC): ICD-10-CM

## 2024-04-16 DIAGNOSIS — J44.1 COPD EXACERBATION (HCC): Primary | ICD-10-CM

## 2024-04-16 DIAGNOSIS — I10 PRIMARY HYPERTENSION: ICD-10-CM

## 2024-04-16 DIAGNOSIS — R06.2 WHEEZING: ICD-10-CM

## 2024-04-16 DIAGNOSIS — J43.9 MIXED RESTRICTIVE AND OBSTRUCTIVE LUNG DISEASE  (HCC): ICD-10-CM

## 2024-04-16 PROBLEM — J44.9 MIXED RESTRICTIVE AND OBSTRUCTIVE LUNG DISEASE (HCC): Status: ACTIVE | Noted: 2024-04-16

## 2024-04-16 PROCEDURE — 99213 OFFICE O/P EST LOW 20 MIN: CPT

## 2024-04-16 PROCEDURE — G2211 COMPLEX E/M VISIT ADD ON: HCPCS

## 2024-04-16 RX ORDER — PREDNISONE 20 MG/1
20 TABLET ORAL DAILY
Qty: 5 TABLET | Refills: 0 | Status: SHIPPED | OUTPATIENT
Start: 2024-04-16 | End: 2024-04-21

## 2024-04-16 RX ORDER — SPIRONOLACTONE 25 MG/1
50 TABLET ORAL DAILY
Qty: 30 TABLET | Refills: 0 | Status: SHIPPED | OUTPATIENT
Start: 2024-04-16

## 2024-04-16 RX ORDER — AZITHROMYCIN 250 MG/1
TABLET, FILM COATED ORAL DAILY
Qty: 6 TABLET | Refills: 0 | Status: SHIPPED | OUTPATIENT
Start: 2024-04-16 | End: 2024-04-21

## 2024-04-16 NOTE — PROGRESS NOTES
Name: Eduardo Joiner      : 1955      MRN: [id number]  Encounter Provider: Evan Arnold MD  Encounter Date: 2024   Encounter department: [facility]    Assessment & Plan   Patient is a 68-year-old male with past medical history of coronary artery hypertension, cirrhosis, mixed obstructive and restrictive pulmonary disease per recent PFT, who presents complaining of wheezing and cough at home.  Patient states he had URI-like symptoms roughly 1 week ago consisting of runny nose, since that point in time he has noticed a persistent wheeze while breathing in addition to occasionally coughing up yellowish sputum.  Patient denies any fevers, chills, nausea, vomiting, increased dyspnea on exertion, or other similar signs or symptoms.  Examination of vitals reveals O2 saturation of 89% on room air with appropriate blood pressure, physical examination reveals diffuse expiratory wheezes. Differential this time includes COPD exacerbation versus persistent URI, patient is prednisone naïve with known pulmonary hypertension, will be trialed on prednisone 20 mg x 5 days in addition to azithromycin x 5 days with return to clinic in 1 week for reassessment.  Patient counseled on findings of most recent PFT, he would likely benefit from inhaled controller medication.  Per Gold criteria he is grade 2 stage B and would benefit from LABA plus LAMA therapy.  Patient is amenable to trying this at future visit, states he wished to complete course of treatment for current exacerbation before initiating new treatment modalities.  Can offer these therapies at upcoming visit, if no significant improvement in O2 saturation and work of breathing patient may require home O2 evaluation as well.    1. COPD exacerbation (HCC)  -     predniSONE 20 mg tablet; Take 1 tablet (20 mg total) by mouth daily for 5 days  -     azithromycin (Zithromax) 250 mg tablet; Take 2 tablets (500 mg total) by mouth daily for 1  day, THEN 1 tablet (250 mg total) daily for 4 days.    2. Mixed restrictive and obstructive lung disease  (HCC)  Assessment & Plan:  PFT 2/29/2024:  Moderate obstructive airflow defect   No significant response to the administration to bronchodilator per ATS Standards  Lung volumes demonstrate mild restriction  Wkzosl-xp-kcwjsleshh reduced diffusion capacity  Positive desaturation study with 4 L needed to maintain oxygen saturations greater than 88% with activity    Per GOLD criteria:  Grade 2, stage B    Plan:  Treat current exacerbation   Pt educated on necessity of inhaled controller medication, stated he wished to complete treatment for exacerbation and then consider initiating controller medication  Patient to return to clinic in 1 week, can be offered trial of LAMA+LABA  Pt aware of high cost of these medications but is amenable to attempting trial   Can re-assess after initiation, may require home O2 supplementation in future       3. Primary hypertension  Assessment & Plan:  BP Readings from Last 2 Encounters:   04/16/24 142/80   03/30/24 (!) 174/82     BP improved on increased dose of Aldactone   Patient tolerating medication, endorses no side effects    Plan:  Continue with Aldactone 50 mg daily  BMP can be ordered at next visit to assess for hyperkalemia       4. Wheezing           Subjective     Wheezing  The current episode started in the past 7 days. The problem occurs constantly. The problem is unchanged. The problem is moderate. Associated symptoms include coughing, rhinorrhea and wheezing. Pertinent negatives include no chest pain, dizziness, fatigue, leg swelling, orthopnea or sore throat. Nothing aggravates the symptoms. There was no intake of a foreign body. He has had no prior steroid use. Past treatments include beta-agonist inhalers. The treatment provided mild relief. There is no history of tobacco use. He has been Behaving normally. Urine output has been normal.     Review of Systems    Constitutional:  Negative for appetite change, fatigue and unexpected weight change.   HENT:  Positive for postnasal drip and rhinorrhea. Negative for sore throat.    Respiratory:  Positive for cough and wheezing.    Cardiovascular:  Negative for chest pain, orthopnea and leg swelling.   Gastrointestinal:  Negative for abdominal pain, nausea and vomiting.   Genitourinary:  Negative for dysuria.   Musculoskeletal:  Negative for arthralgias and myalgias.   Skin:  Negative for pallor.   Neurological:  Negative for dizziness and weakness.   Hematological:  Negative for adenopathy.   Psychiatric/Behavioral:  Negative for agitation.        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    • Psoriasis    • Pulmonary HTN (HCC)    • Thrombocytopenia (HCC)      Past Surgical History:   Procedure Laterality Date   • CHOLECYSTECTOMY       Family History   Problem Relation Age of Onset   • Diabetes Mother    • Coronary artery disease Mother    • Stroke Father         Major strokes x2    • Diabetes Sister      Social History     Socioeconomic History   • Marital status: /Civil Union     Spouse name: None   • Number of children: None   • Years of education: None   • Highest education level: None   Occupational History   • Occupation: Disabled    Tobacco Use   • Smoking status: Former     Current packs/day: 0.00     Types: Cigarettes     Quit date:      Years since quittin.3   • Smokeless tobacco: Never   Substance and Sexual Activity   • Alcohol use: Not Currently      Comment: quit  - As per Jessica    • Drug use: Not Currently     Comment: He used intravenous drugs only once at age 17 - As per [place]    • Sexual activity: None   Other Topics Concern   • None   Social History Narrative    · Most recent tobacco use screenin2019      · Do you currently or have you served in the [place] Armed [employer]:   No      · Were you activated, into active duty, as a member of the [employer] or as a Reservist:   No      · Sexual orientation:   Heterosexual     As per [place]      Social Determinants of Health     Financial Resource Strain: Patient Declined (2023)    Overall Financial Resource Strain (CARDIA)    • Difficulty of Paying Living Expenses: Patient declined   Food Insecurity: Not on file   Transportation Needs: Patient Declined (2023)    PRAPARE - Transportation    • Lack of Transportation (Medical): Patient declined    • Lack of Transportation (Non-Medical): Patient declined   Physical Activity: Not on file   Stress: Not on file   Social Connections: Not on file   Intimate Partner Violence: Not on file   Housing Stability: Not on file     Current Outpatient Medications on File Prior to Visit   Medication Sig   • albuterol (Ventolin HFA) 90 mcg/act inhaler Inhale 2 puffs every 4 (four) hours as needed for wheezing (cough)   • benzonatate (TESSALON PERLES) 100 mg capsule Take 1 capsule (100 mg total) by mouth 3 (three) times a day as needed for cough   • fluticasone (FLONASE) 50 mcg/act nasal spray 1 spray into each nostril daily   • spironolactone (ALDACTONE) 25 mg tablet Take 2 tablets (50 mg total) by mouth daily   • Diclofenac Sodium (VOLTAREN) 1 % Apply 2 g topically 4 (four) times a day (Patient not taking: Reported on 2024)     No Known Allergies  Immunization History   Administered Date(s) Administered   • COVID-19 Moderna Vac BIVALENT 12 Yr+ IM 0.5 ML 2022   • COVID-19 PFIZER VACCINE 0.3 ML IM 2021, 2021, 10/28/2021   • COVID-19  "Pfizer mRNA vacc PF anthony-sucrose 12 yr and older (Comirnaty) 10/30/2023   • INFLUENZA 10/02/2012, 09/02/2015, 10/06/2017, 10/12/2018, 09/02/2020, 10/08/2021, 10/02/2022, 10/30/2023   • Pneumococcal Conjugate 13-Valent 05/25/2019   • Pneumococcal Conjugate Vaccine 20-valent (Pcv20), Polysace 04/12/2024   • Pneumococcal Polysaccharide PPV23 03/03/2017   • Tdap 03/27/2018   • Zoster Vaccine Recombinant 04/12/2024       Objective     /80   Pulse 102   Temp 98 °F (36.7 °C)   Resp 18   Ht 5' 7\" (1.702 m)   Wt 69.4 kg (153 lb)   SpO2 (!) 89%   BMI 23.96 kg/m²     Physical Exam  Constitutional:       General: He is not in acute distress.     Appearance: Normal appearance. He is ill-appearing.      Comments: Chronically ill appearing    HENT:      Head: Normocephalic and atraumatic.      Right Ear: External ear normal.      Left Ear: External ear normal.      Nose: Nose normal.      Mouth/Throat:      Pharynx: Oropharynx is clear.      Comments: Edentulous   Eyes:      General: No scleral icterus.     Extraocular Movements: Extraocular movements intact.      Conjunctiva/sclera: Conjunctivae normal.   Cardiovascular:      Rate and Rhythm: Normal rate and regular rhythm.      Pulses: Normal pulses.      Heart sounds: Normal heart sounds. No murmur heard.     No friction rub. No gallop.   Pulmonary:      Effort: No respiratory distress.      Breath sounds: No stridor. Wheezing present. No rhonchi or rales.      Comments: Occasional pursed-lip breathing observed   Expiratory wheezes auscultated in all lung fields  Abdominal:      General: Abdomen is flat.      Palpations: Abdomen is soft. There is no mass.      Tenderness: There is no abdominal tenderness. There is no guarding.   Musculoskeletal:      Cervical back: Normal range of motion.      Right lower leg: No edema.      Left lower leg: No edema.   Skin:     General: Skin is warm and dry.   Neurological:      Mental Status: He is alert. Mental status is at " baseline.   Psychiatric:         Mood and Affect: Mood normal.     Evan Arnold MD

## 2024-04-16 NOTE — TELEPHONE ENCOUNTER
Cody, this is Eduardo Winters. I had spoken to or I visited Evan Dumont earlier today at out the hospital and I forgot to tell him that my prescription of spironolactone was only three pills left and I need another prescription along with the other prescription that he applied for me today. Thank you very much

## 2024-04-16 NOTE — ASSESSMENT & PLAN NOTE
BP Readings from Last 2 Encounters:   04/16/24 142/80   03/30/24 (!) 174/82     BP improved on increased dose of Aldactone   Patient tolerating medication, endorses no side effects    Plan:  Continue with Aldactone 50 mg daily  BMP can be ordered at next visit to assess for hyperkalemia

## 2024-04-16 NOTE — ASSESSMENT & PLAN NOTE
PFT 2/29/2024:  Moderate obstructive airflow defect   No significant response to the administration to bronchodilator per ATS Standards  Lung volumes demonstrate mild restriction  Qtkyta-sd-srtfvfbmbw reduced diffusion capacity  Positive desaturation study with 4 L needed to maintain oxygen saturations greater than 88% with activity    Per GOLD criteria:  Grade 2, stage B    Plan:  Treat current exacerbation   Pt educated on necessity of inhaled controller medication, stated he wished to complete treatment for exacerbation and then consider initiating controller medication  Patient to return to clinic in 1 week, can be offered trial of LAMA+LABA  Pt aware of high cost of these medications but is amenable to attempting trial   Can re-assess after initiation, may require home O2 supplementation in future

## 2024-05-07 ENCOUNTER — OFFICE VISIT (OUTPATIENT)
Age: 69
End: 2024-05-07

## 2024-05-07 VITALS
TEMPERATURE: 97.6 F | BODY MASS INDEX: 24.17 KG/M2 | RESPIRATION RATE: 18 BRPM | HEIGHT: 67 IN | OXYGEN SATURATION: 90 % | DIASTOLIC BLOOD PRESSURE: 72 MMHG | WEIGHT: 154 LBS | HEART RATE: 88 BPM | SYSTOLIC BLOOD PRESSURE: 151 MMHG

## 2024-05-07 DIAGNOSIS — J98.4 MIXED RESTRICTIVE AND OBSTRUCTIVE LUNG DISEASE  (HCC): Primary | ICD-10-CM

## 2024-05-07 DIAGNOSIS — K74.60 HEPATIC CIRRHOSIS, UNSPECIFIED HEPATIC CIRRHOSIS TYPE, UNSPECIFIED WHETHER ASCITES PRESENT (HCC): ICD-10-CM

## 2024-05-07 DIAGNOSIS — I10 PRIMARY HYPERTENSION: ICD-10-CM

## 2024-05-07 DIAGNOSIS — J43.9 MIXED RESTRICTIVE AND OBSTRUCTIVE LUNG DISEASE  (HCC): Primary | ICD-10-CM

## 2024-05-07 RX ORDER — SPIRONOLACTONE 25 MG/1
50 TABLET ORAL DAILY
Qty: 180 TABLET | Refills: 0 | Status: SHIPPED | OUTPATIENT
Start: 2024-05-07 | End: 2024-08-05

## 2024-05-07 RX ORDER — UMECLIDINIUM BROMIDE AND VILANTEROL TRIFENATATE 62.5; 25 UG/1; UG/1
1 POWDER RESPIRATORY (INHALATION) DAILY
Qty: 120 BLISTER | Refills: 1 | Status: SHIPPED | OUTPATIENT
Start: 2024-05-07 | End: 2024-05-09 | Stop reason: ALTCHOICE

## 2024-05-07 NOTE — PROGRESS NOTES
Name: Eduardo Joiner      : 1955      MRN: [id number]  Encounter Provider: Evan Arnold MD  Encounter Date: 2024   Encounter department: [facility]    Assessment & Plan   Patient is a 68-year-old male with past medical history of mixed obstructive and restrictive lung disease, pulmonary artery hypertension, hepatic cirrhosis, thrombocytopenia, who presents as follow-up for COPD exacerbation.  As per patient, he states that the steroid burst and azithromycin he was prescribed at his last visit was highly effective in reducing his symptom burden.  Patient now only endorses occasional dry cough, significantly improved from prior.  Patient additionally endorses improved work of breathing with less dyspnea on exertion than previous visits.  Patient informed that he would likely benefit from controller medication, amenable to a trial.  Given Gold criteria grade 2 stage B patient would likely benefit from LABA plus LAMA therapy.  Orders as below, patient to return in 2 months for recheck of serum potassium secondary to spironolactone treatment plus medication tolerance.  Patient advised if his insurance did not cover prescribed inhaler to call the office for alternative options or prescription of 2 separate inhaled medications.    1. Mixed restrictive and obstructive lung disease  (HCC)  Assessment & Plan:  PFT 2024:  Moderate obstructive airflow defect   No significant response to the administration to bronchodilator per ATS Standards  Lung volumes demonstrate mild restriction  Tqrxwc-lp-nyfwzexwbm reduced diffusion capacity  Positive desaturation study with 4 L needed to maintain oxygen saturations greater than 88% with activity    Per GOLD criteria:  Grade 2, stage B    Plan:  Patient endorses significant improvement of symptoms after steroid burst plus azithromycin  Patient would likely benefit from controller therapy  Per guidelines, patient qualify for LABA plus LAMA  therapy  An oral Ellipta ordered at this visit, patient advised to check with some concerns and see if it is covered.    If not covered, will attempt order of equivalent medication or independent inhalers      2. Hepatic cirrhosis, unspecified hepatic cirrhosis type, unspecified whether ascites present (HCC)  Assessment & Plan:  MELD 3.0: 17 at 10/18/2023  8:26 AM  MELD-Na: 17 at 10/18/2023  8:26 AM  Calculated from:  Serum Creatinine: 1.03 mg/dL at 10/18/2023  8:26 AM  Serum Sodium: 143 mmol/L (Using max of 137 mmol/L) at 10/18/2023  8:26 AM  Total Bilirubin: 4.50 mg/dL at 10/18/2023  8:26 AM  Serum Albumin: 3.3 g/dL at 10/18/2023  8:26 AM  INR(ratio): 1.46 at 10/18/2023  8:26 AM  Age at listing (hypothetical): 68 years  Sex: Male at 10/18/2023  8:26 AM    Last MELD Na 17  Patient had previously been referred to [facility] transplant service, discontinued relationship with them  Patient had also been previously referred to gastroenterology transplant service at this institution, appointment not yet scheduled  Referral reprinted for patient at this visit, encouraged patient to call and establish care with hepatology      3. Primary hypertension  Assessment & Plan:  BP Readings from Last 1 Encounters:   05/07/24 151/72     Patient BP elevated at office visit today    Plan:  Repeat potassium check ordered for visit today  If potassium within normal limits and blood pressure remains elevated at upcoming appointment, may consider increasing Aldactone dosage  Blood pressure remains poorly controlled and Aldactone cannot be feasibly increased, may require addition of second agent for better control    Orders:  -     spironolactone (ALDACTONE) 25 mg tablet; Take 2 tablets (50 mg total) by mouth daily  -     Basic metabolic panel; Future           Subjective     Shortness of Breath  The current episode started more than 1 month ago. The problem occurs intermittently. The problem has been rapidly improving since onset. The  problem is mild. Associated symptoms include coughing and wheezing. Nothing aggravates the symptoms. There was no intake of a foreign body. He is currently using steroids. Past treatments include beta-agonist inhalers and one or more prescription drugs. The treatment provided significant relief. His past medical history is significant for tobacco use. He has been Behaving normally.   Review of Systems   Respiratory:  Positive for cough, shortness of breath and wheezing.      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    • Psoriasis    • Pulmonary HTN (HCC)    • Thrombocytopenia (HCC)      Past Surgical History:   Procedure Laterality Date   • CHOLECYSTECTOMY       Family History   Problem Relation Age of Onset   • Diabetes Mother    • Coronary artery disease Mother    • Stroke Father         Major strokes x2    • Diabetes Sister      Social History     Socioeconomic History   • Marital status: /Civil Union     Spouse name: None   • Number of children: None   • Years of education: None   • Highest education level: None   Occupational History   • Occupation: Disabled    Tobacco Use   • Smoking status: Former     Current packs/day: 0.00     Types: Cigarettes     Quit date:      Years since quittin.3   • Smokeless tobacco: Never   Substance and Sexual Activity   • Alcohol use: Not Currently     Comment: quit  - As per Jessica    • Drug use: Not Currently     Comment: He used intravenous drugs only once at age 17 - As per Jessica    • Sexual activity:  None   Other Topics Concern   • None   Social History Narrative    · Most recent tobacco use screenin2019      · Do you currently or have you served in the [place] [employer]:   No      · Were you activated, into active duty, as a member of the [employer] or as a Reservist:   No      · Sexual orientation:   Heterosexual     As per [place]      Social Determinants of Health     Financial Resource Strain: Patient Declined (2023)    Overall Financial Resource Strain (CARDIA)    • Difficulty of Paying Living Expenses: Patient declined   Food Insecurity: Not on file   Transportation Needs: Patient Declined (2023)    PRAPARE - Transportation    • Lack of Transportation (Medical): Patient declined    • Lack of Transportation (Non-Medical): Patient declined   Physical Activity: Not on file   Stress: Not on file   Social Connections: Not on file   Intimate Partner Violence: Not on file   Housing Stability: Not on file     Current Outpatient Medications on File Prior to Visit   Medication Sig   • albuterol (Ventolin HFA) 90 mcg/act inhaler Inhale 2 puffs every 4 (four) hours as needed for wheezing (cough)   • benzonatate (TESSALON PERLES) 100 mg capsule Take 1 capsule (100 mg total) by mouth 3 (three) times a day as needed for cough   • fluticasone (FLONASE) 50 mcg/act nasal spray 1 spray into each nostril daily   • Diclofenac Sodium (VOLTAREN) 1 % Apply 2 g topically 4 (four) times a day (Patient not taking: Reported on 2024)     No Known Allergies  Immunization History   Administered Date(s) Administered   • COVID-19 Moderna Vac BIVALENT 12 Yr+ IM 0.5 ML 2022   • COVID-19 PFIZER VACCINE 0.3 ML IM 2021, 2021, 10/28/2021   • COVID-19 Pfizer mRNA vacc PF anthony-sucrose 12 yr and older (Comirnaty) 10/30/2023   • INFLUENZA 10/02/2012, 2015, 10/06/2017, 10/12/2018, 2020, 10/08/2021, 10/02/2022, 10/30/2023   • Pneumococcal Conjugate 13-Valent 2019   • Pneumococcal  "Conjugate Vaccine 20-valent (Pcv20), Polysace 04/12/2024   • Pneumococcal Polysaccharide PPV23 03/03/2017   • Tdap 03/27/2018   • Zoster Vaccine Recombinant 04/12/2024       Objective     /72   Pulse 88   Temp 97.6 °F (36.4 °C)   Resp 18   Ht 5' 7\" (1.702 m)   Wt 69.9 kg (154 lb)   SpO2 90%   BMI 24.12 kg/m²     Physical Exam  Constitutional:       General: He is not in acute distress.     Appearance: Normal appearance. He is ill-appearing.      Comments: Chronically ill appearing    HENT:      Head: Normocephalic and atraumatic.      Right Ear: External ear normal.      Left Ear: External ear normal.      Nose: Nose normal.      Mouth/Throat:      Pharynx: Oropharynx is clear.      Comments: Edentulous   Eyes:      General: No scleral icterus.     Extraocular Movements: Extraocular movements intact.      Conjunctiva/sclera: Conjunctivae normal.   Cardiovascular:      Rate and Rhythm: Normal rate and regular rhythm.      Pulses: Normal pulses.      Heart sounds: Normal heart sounds. No murmur heard.     No friction rub. No gallop.   Pulmonary:      Effort: No respiratory distress.      Breath sounds: No stridor. Wheezing present. No rhonchi or rales.      Comments: Patient still demonstrates occasional pursed lip breathing, but biphasic wheezing has resolved and all lung fields  Abdominal:      General: Abdomen is flat.      Palpations: Abdomen is soft. There is no mass.      Tenderness: There is no abdominal tenderness. There is no guarding.   Musculoskeletal:      Cervical back: Normal range of motion.      Right lower leg: No edema.      Left lower leg: No edema.   Skin:     General: Skin is warm and dry.   Neurological:      Mental Status: He is alert. Mental status is at baseline.   Psychiatric:         Mood and Affect: Mood normal.   Evan Arnold MD    "

## 2024-05-07 NOTE — ASSESSMENT & PLAN NOTE
MELD 3.0: 17 at 10/18/2023  8:26 AM  MELD-Na: 17 at 10/18/2023  8:26 AM  Calculated from:  Serum Creatinine: 1.03 mg/dL at 10/18/2023  8:26 AM  Serum Sodium: 143 mmol/L (Using max of 137 mmol/L) at 10/18/2023  8:26 AM  Total Bilirubin: 4.50 mg/dL at 10/18/2023  8:26 AM  Serum Albumin: 3.3 g/dL at 10/18/2023  8:26 AM  INR(ratio): 1.46 at 10/18/2023  8:26 AM  Age at listing (hypothetical): 68 years  Sex: Male at 10/18/2023  8:26 AM    Last MELD Na 17  Patient had previously been referred to [facility] transplant service, discontinued relationship with them  Patient had also been previously referred to gastroenterology transplant service at this institution, appointment not yet scheduled  Referral reprinted for patient at this visit, encouraged patient to call and establish care with hepatology

## 2024-05-09 ENCOUNTER — TELEPHONE (OUTPATIENT)
Age: 69
End: 2024-05-09

## 2024-05-09 DIAGNOSIS — J98.4 MIXED RESTRICTIVE AND OBSTRUCTIVE LUNG DISEASE  (HCC): Primary | ICD-10-CM

## 2024-05-09 DIAGNOSIS — J43.9 MIXED RESTRICTIVE AND OBSTRUCTIVE LUNG DISEASE  (HCC): Primary | ICD-10-CM

## 2024-05-09 NOTE — ASSESSMENT & PLAN NOTE
PFT 2/29/2024:  Moderate obstructive airflow defect   No significant response to the administration to bronchodilator per ATS Standards  Lung volumes demonstrate mild restriction  Uyicba-nb-olokutxbbo reduced diffusion capacity  Positive desaturation study with 4 L needed to maintain oxygen saturations greater than 88% with activity    Per GOLD criteria:  Grade 2, stage B    Plan:  Patient endorses significant improvement of symptoms after steroid burst plus azithromycin  Patient would likely benefit from controller therapy  Per guidelines, patient qualify for LABA plus LAMA therapy  An oral Ellipta ordered at this visit, patient advised to check with some concerns and see if it is covered.    If not covered, will attempt order of equivalent medication or independent inhalers

## 2024-05-09 NOTE — TELEPHONE ENCOUNTER
Patient called regarding new Rx: Anoro Ellipta inhaler.    Patient states insurance does not cover. Patient inquiring about new alt medication.    Please review and advise.  Thank you.

## 2024-05-09 NOTE — ASSESSMENT & PLAN NOTE
BP Readings from Last 1 Encounters:   05/07/24 151/72     Patient BP elevated at office visit today    Plan:  Repeat potassium check ordered for visit today  If potassium within normal limits and blood pressure remains elevated at upcoming appointment, may consider increasing Aldactone dosage  Blood pressure remains poorly controlled and Aldactone cannot be feasibly increased, may require addition of second agent for better control

## 2024-05-09 NOTE — TELEPHONE ENCOUNTER
I have placed order for equivalent medication, Stiolto respimat. Of this is not covered by his insurance I will attempt to order separate inhalers

## 2024-05-30 ENCOUNTER — OFFICE VISIT (OUTPATIENT)
Age: 69
End: 2024-05-30
Payer: COMMERCIAL

## 2024-05-30 VITALS
HEART RATE: 89 BPM | TEMPERATURE: 98.4 F | RESPIRATION RATE: 18 BRPM | DIASTOLIC BLOOD PRESSURE: 70 MMHG | OXYGEN SATURATION: 90 % | SYSTOLIC BLOOD PRESSURE: 128 MMHG | BODY MASS INDEX: 23.61 KG/M2 | HEIGHT: 67 IN | WEIGHT: 150.4 LBS

## 2024-05-30 DIAGNOSIS — J43.9 MIXED RESTRICTIVE AND OBSTRUCTIVE LUNG DISEASE  (HCC): Primary | ICD-10-CM

## 2024-05-30 DIAGNOSIS — K74.60 HEPATIC CIRRHOSIS, UNSPECIFIED HEPATIC CIRRHOSIS TYPE, UNSPECIFIED WHETHER ASCITES PRESENT (HCC): ICD-10-CM

## 2024-05-30 DIAGNOSIS — I27.20 PULMONARY HYPERTENSION (HCC): ICD-10-CM

## 2024-05-30 DIAGNOSIS — J98.4 MIXED RESTRICTIVE AND OBSTRUCTIVE LUNG DISEASE  (HCC): Primary | ICD-10-CM

## 2024-05-30 DIAGNOSIS — Z12.11 SCREENING FOR COLON CANCER: ICD-10-CM

## 2024-05-30 PROCEDURE — 99213 OFFICE O/P EST LOW 20 MIN: CPT

## 2024-05-30 PROCEDURE — G2211 COMPLEX E/M VISIT ADD ON: HCPCS

## 2024-05-30 RX ORDER — FORMOTEROL FUMARATE DIHYDRATE 20 UG/2ML
20 SOLUTION RESPIRATORY (INHALATION) 2 TIMES DAILY
Qty: 225 ML | Refills: 1 | Status: CANCELLED | OUTPATIENT
Start: 2024-05-30

## 2024-05-30 RX ORDER — TIOTROPIUM BROMIDE 18 UG/1
18 CAPSULE ORAL; RESPIRATORY (INHALATION) DAILY
Qty: 90 CAPSULE | Refills: 1 | Status: CANCELLED | OUTPATIENT
Start: 2024-05-30

## 2024-05-30 RX ORDER — TIOTROPIUM BROMIDE INHALATION SPRAY 1.56 UG/1
2 SPRAY, METERED RESPIRATORY (INHALATION) DAILY
Qty: 12 G | Refills: 1 | Status: SHIPPED | OUTPATIENT
Start: 2024-05-30

## 2024-05-30 RX ORDER — SALMETEROL XINAFOATE 50 MCG
1 BLISTER, WITH INHALATION DEVICE INHALATION 2 TIMES DAILY
Qty: 180 BLISTER | Refills: 1 | Status: SHIPPED | OUTPATIENT
Start: 2024-05-30

## 2024-05-30 NOTE — ASSESSMENT & PLAN NOTE
PFT 2/29/2024:  Moderate obstructive airflow defect   No significant response to the administration to bronchodilator per ATS Standards  Lung volumes demonstrate mild restriction  Quedfk-fs-iubsgnxcsb reduced diffusion capacity  Positive desaturation study with 4 L needed to maintain oxygen saturations greater than 88% with activity    Per GOLD criteria:  Grade 2, stage B    Plan:  Patient endorses significant improvement of symptoms after course of Stiolto  However, patient states that this medication is intolerably expensive  Patient would likely benefit from LABA plus LAMA therapy  Will attempt to separate medications to see if this is less expensive for patient  Patient prescribed Spiriva Respimat 1.25mcg/act and salmeterol Diskus 50mcg/act at visit today  Complete previously ordered PFT  Follow-up in 3 months at next scheduled well visit, or sooner if needed for problem

## 2024-05-30 NOTE — ASSESSMENT & PLAN NOTE
MELD 3.0: 17 at 10/18/2023  8:26 AM  MELD-Na: 17 at 10/18/2023  8:26 AM  Calculated from:  Serum Creatinine: 1.03 mg/dL at 10/18/2023  8:26 AM  Serum Sodium: 143 mmol/L (Using max of 137 mmol/L) at 10/18/2023  8:26 AM  Total Bilirubin: 4.50 mg/dL at 10/18/2023  8:26 AM  Serum Albumin: 3.3 g/dL at 10/18/2023  8:26 AM  INR(ratio): 1.46 at 10/18/2023  8:26 AM  Age at listing (hypothetical): 68 years  Sex: Male at 10/18/2023  8:26 AM    Last MELD Na 17  Patient encouraged to follow-up on referral to hepatology once again

## 2024-05-30 NOTE — ASSESSMENT & PLAN NOTE
Patient currently follows with pulmonology, has refused previously offered right heart catheterizations to assess pulmonary artery wedge pressure.  Patient still endorses dyspnea, but states it is significantly improved on his Stiolto inhaler regimen.  Patient endorses no orthopnea, but does state he becomes short of breath with moderate exertion.    Plan:  Continue close follow-up with pulmonology  Continue with PFT ordered by pulmonology colleagues  Can continue to offer right heart cath and resumption of prior medications, but patient appears to be adequately compensated at this point in time and may not necessarily benefit from further medication  See principal problem of mixed obstructive and restrictive lung disease for inhaler regimen information

## 2024-05-30 NOTE — PROGRESS NOTES
Ambulatory Visit  Name: Eduardo Joiner      : 1955      MRN: [id number]  Encounter Provider: Evan Arnold MD  Encounter Date: 2024   Encounter department: [facility]    Assessment & Plan   1. Mixed restrictive and obstructive lung disease  (HCC)  Assessment & Plan:  PFT 2024:  Moderate obstructive airflow defect   No significant response to the administration to bronchodilator per ATS Standards  Lung volumes demonstrate mild restriction  Jevxjh-wp-hzrbcxjjts reduced diffusion capacity  Positive desaturation study with 4 L needed to maintain oxygen saturations greater than 88% with activity    Per GOLD criteria:  Grade 2, stage B    Plan:  Patient endorses significant improvement of symptoms after course of Stiolto  However, patient states that this medication is intolerably expensive  Patient would likely benefit from LABA plus LAMA therapy  Will attempt to separate medications to see if this is less expensive for patient  Patient prescribed Spiriva Respimat 1.25mcg/act and salmeterol Diskus 50mcg/act at visit today  Complete previously ordered PFT  Follow-up in 3 months at next scheduled well visit, or sooner if needed for problem  Orders:  -     salmeterol (Serevent Diskus) 50 mcg/act diskus inhaler; Inhale 1 puff 2 (two) times a day  -     tiotropium (Spiriva Respimat) 1.25 MCG/ACT AERS inhaler; Inhale 2 puffs daily  2. Pulmonary hypertension (HCC)  Assessment & Plan:  Patient currently follows with pulmonology, has refused previously offered right heart catheterizations to assess pulmonary artery wedge pressure.  Patient still endorses dyspnea, but states it is significantly improved on his Stiolto inhaler regimen.  Patient endorses no orthopnea, but does state he becomes short of breath with moderate exertion.    Plan:  Continue close follow-up with pulmonology  Continue with PFT ordered by pulmonology colleagues  Can continue to offer right heart cath and  Exercise in our most days of the week.  Wear sunscreen when outdoors.  Always wear a seatbelt.  Get at least 3 servings of dairy products a day.    Take Vitamin D3 2,000 IU a day. Get the Covid booster vaccine       resumption of prior medications, but patient appears to be adequately compensated at this point in time and may not necessarily benefit from further medication  See principal problem of mixed obstructive and restrictive lung disease for inhaler regimen information  Orders:  -     salmeterol (Serevent Diskus) 50 mcg/act diskus inhaler; Inhale 1 puff 2 (two) times a day  -     tiotropium (Spiriva Respimat) 1.25 MCG/ACT AERS inhaler; Inhale 2 puffs daily  3. Hepatic cirrhosis, unspecified hepatic cirrhosis type, unspecified whether ascites present (HCC)  Assessment & Plan:  MELD 3.0: 17 at 10/18/2023  8:26 AM  MELD-Na: 17 at 10/18/2023  8:26 AM  Calculated from:  Serum Creatinine: 1.03 mg/dL at 10/18/2023  8:26 AM  Serum Sodium: 143 mmol/L (Using max of 137 mmol/L) at 10/18/2023  8:26 AM  Total Bilirubin: 4.50 mg/dL at 10/18/2023  8:26 AM  Serum Albumin: 3.3 g/dL at 10/18/2023  8:26 AM  INR(ratio): 1.46 at 10/18/2023  8:26 AM  Age at listing (hypothetical): 68 years  Sex: Male at 10/18/2023  8:26 AM    Last MELD Na 17  Patient encouraged to follow-up on referral to hepatology once again  4. Screening for colon cancer  -     Cologuard         History of Present Illness     Dyspnea  Patient complains of shortness of breath. Symptoms occur after more than one flight stairs, with one block walking. Symptoms began several years ago, gradually improving since initiation of inhaled medications. Associated symptoms include  dry cough, dyspnea on exertion, shortness of breath, sputum production, and wheezing. He denies constant cough, difficulty breathing, hemoptysis, post nasal drip, and unresolving pneumonia. He has not had recent travel. Weight has been stable. Symptoms are exacerbated by any exercise, moderate activity, walking, and yardwork. Symptoms are alleviated by rest and medication(s) (albuterol).     Review of Systems   Constitutional:  Negative for chills and fever.   HENT:  Negative for ear pain and sore throat.     Eyes:  Negative for pain and visual disturbance.   Respiratory:  Positive for cough and shortness of breath. Negative for wheezing.    Cardiovascular:  Negative for chest pain and palpitations.   Gastrointestinal:  Negative for abdominal pain and vomiting.   Genitourinary:  Negative for dysuria and hematuria.   Musculoskeletal:  Negative for arthralgias and back pain.   Skin:  Negative for color change and rash.   Neurological:  Negative for seizures and syncope.   All other systems reviewed and are negative.    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    • Psoriasis    • Pulmonary HTN (HCC)    • Thrombocytopenia (HCC)      Past Surgical History:   Procedure Laterality Date   • CHOLECYSTECTOMY       Family History   Problem Relation Age of Onset   • Diabetes Mother    • Coronary artery disease Mother    • Stroke Father         Major strokes x2    • Diabetes Sister      Social History     Tobacco Use   • Smoking status: Former     Current packs/day: 0.00     Types: Cigarettes     Quit date:      Years since quittin.4   • Smokeless tobacco: Never   Substance and Sexual Activity   • Alcohol use: Not Currently     Comment: quit  - As per [place]    • Drug use: Not Currently     Comment: He used intravenous drugs only once at age 17 - As per [place]    • Sexual activity: Not on file     Current Outpatient Medications on File Prior to Visit   Medication Sig   • albuterol (Ventolin HFA) 90 mcg/act inhaler Inhale 2 puffs every 4 (four) hours as needed for  "wheezing (cough)   • benzonatate (TESSALON PERLES) 100 mg capsule Take 1 capsule (100 mg total) by mouth 3 (three) times a day as needed for cough   • fluticasone (FLONASE) 50 mcg/act nasal spray 1 spray into each nostril daily   • spironolactone (ALDACTONE) 25 mg tablet Take 2 tablets (50 mg total) by mouth daily   • [DISCONTINUED] tiotropium-olodaterol (STIOLTO RESPIMAT) 2.5-2.5 MCG/ACT inhaler Inhale 2 puffs daily   • Diclofenac Sodium (VOLTAREN) 1 % Apply 2 g topically 4 (four) times a day (Patient not taking: Reported on 2/9/2024)     No Known Allergies  Immunization History   Administered Date(s) Administered   • COVID-19 Moderna Vac BIVALENT 12 Yr+ IM 0.5 ML 12/05/2022   • COVID-19 PFIZER VACCINE 0.3 ML IM 03/26/2021, 04/16/2021, 10/28/2021   • COVID-19 Pfizer mRNA vacc PF anthony-sucrose 12 yr and older (Comirnaty) 10/30/2023   • INFLUENZA 10/02/2012, 09/02/2015, 10/06/2017, 10/12/2018, 09/02/2020, 10/08/2021, 10/02/2022, 10/30/2023   • Pneumococcal Conjugate 13-Valent 05/25/2019   • Pneumococcal Conjugate Vaccine 20-valent (Pcv20), Polysace 04/12/2024   • Pneumococcal Polysaccharide PPV23 03/03/2017   • Respiratory Syncytial Virus Vaccine (Recombinant, Adjuvanted) 05/07/2024   • Tdap 03/27/2018   • Zoster Vaccine Recombinant 04/12/2024     Objective     /70   Pulse 89   Temp 98.4 °F (36.9 °C)   Resp 18   Ht 5' 7\" (1.702 m)   Wt 68.2 kg (150 lb 6.4 oz)   SpO2 90%   BMI 23.56 kg/m²     Physical Exam  Vitals and nursing note reviewed.   Constitutional:       General: He is not in acute distress.     Appearance: He is well-developed.   HENT:      Head: Normocephalic and atraumatic.   Eyes:      General: No scleral icterus.     Conjunctiva/sclera: Conjunctivae normal.   Cardiovascular:      Rate and Rhythm: Normal rate and regular rhythm.      Heart sounds: No murmur heard.  Pulmonary:      Effort: Pulmonary effort is normal. No respiratory distress.      Breath sounds: Normal breath sounds. No " wheezing, rhonchi or rales.      Comments: Airflow improved from prior exams   Abdominal:      General: There is no distension.      Palpations: Abdomen is soft. There is no mass.      Tenderness: There is no abdominal tenderness. There is no guarding or rebound.   Musculoskeletal:         General: No swelling.      Cervical back: Neck supple.   Skin:     General: Skin is warm and dry.   Neurological:      Mental Status: He is alert.   Psychiatric:         Mood and Affect: Mood normal.   Administrative Statements

## 2024-06-03 ENCOUNTER — OFFICE VISIT (OUTPATIENT)
Dept: PULMONOLOGY | Facility: CLINIC | Age: 69
End: 2024-06-03
Payer: COMMERCIAL

## 2024-06-03 VITALS
TEMPERATURE: 97.2 F | OXYGEN SATURATION: 92 % | SYSTOLIC BLOOD PRESSURE: 142 MMHG | HEIGHT: 67 IN | DIASTOLIC BLOOD PRESSURE: 82 MMHG | WEIGHT: 151 LBS | HEART RATE: 87 BPM | BODY MASS INDEX: 23.7 KG/M2

## 2024-06-03 DIAGNOSIS — I27.20 PULMONARY HYPERTENSION (HCC): Primary | ICD-10-CM

## 2024-06-03 DIAGNOSIS — Z87.891 EX-SMOKER: ICD-10-CM

## 2024-06-03 DIAGNOSIS — J43.9 MIXED RESTRICTIVE AND OBSTRUCTIVE LUNG DISEASE  (HCC): ICD-10-CM

## 2024-06-03 DIAGNOSIS — J98.4 MIXED RESTRICTIVE AND OBSTRUCTIVE LUNG DISEASE  (HCC): ICD-10-CM

## 2024-06-03 PROCEDURE — 99215 OFFICE O/P EST HI 40 MIN: CPT | Performed by: STUDENT IN AN ORGANIZED HEALTH CARE EDUCATION/TRAINING PROGRAM

## 2024-06-03 PROCEDURE — G2211 COMPLEX E/M VISIT ADD ON: HCPCS | Performed by: STUDENT IN AN ORGANIZED HEALTH CARE EDUCATION/TRAINING PROGRAM

## 2024-06-03 NOTE — PATIENT INSTRUCTIONS
It was a pleasure seeing you today!    We gave you 2 samples of Stiolto   No further pulmonary hypertension workup   Follow up in 4 months     Dacia Healy MD  Pulmonary and Critical Care Medicine

## 2024-06-03 NOTE — PROGRESS NOTES
Pulmonary Outpatient Progress Note   Eduardo Joiner 69 y.o. male MRN: [id number]  6/3/2024      Assessment:    COPD, group B, mMRC 2-3, with exertional dyspnea, FEV1 69, mild restriction on PFT.  Was placed on LAMA LABA by primary care, was prescribed Stiolto and then switched over to 2 separate LAMA and LABA inhalers.  Today we gave him to samples of Stiolto to use in the meantime.  We discussed his exertional hypoxia and how he qualifies for oxygen.  He declines oxygen therapy.  States he prefers to talk with primary care and says he will also pace himself.  Portal pulmonary hypertension, used to follow-up at [facility] was on Opsumit and then discontinued for unclear reasons, he had a right heart cath that showed PA pressures 53/18 with PAP of 34 wedge 14 on a right heart cath in 2019, he was on macitentan and sildenafil as well.  He establish care with us for further pulmonary hypertension management, we obtained all the precursor workup prior to a right heart catheter as V/Q, PFTs, HRCT's.  We placed the order for right heart cath but he declined.  He gave unclear reasoning as to why he declined but says he does not want further workup.  As of now he is clinically stable from his pulmonary pretension standpoint.  Discussed referral to cardiology but declined.  Hepatitis C with cirrhosis complicated by hepatocellular carcinoma  Hepatic cirrhosis with MELD score mildly elevated  Nicotine dependence in remission quit 2530 years ago    Plan:    2 samples of Stiolto given to patient  Continue rescue inhaler as needed  He declines further pulmonary hypertension workup such as a right heart catheterization.  Therefore will not offer pulm hypertension treatment at this point  Declines walk test and oxygen therapy today in clinic.  He does not want oxygen.  Advised he should follow-up in 4 months, he states he will talk to his primary care doctor to see if that is reasonable  I have spent a total time of 41 minutes on  06/03/24 in caring for this patient including Diagnostic results, Prognosis, Risks and benefits of tx options, Instructions for management, Patient and family education, Importance of tx compliance, Risk factor reductions, Impressions, Counseling / Coordination of care, Documenting in the medical record, Reviewing / ordering tests, medicine, procedures  , and Obtaining or reviewing history  .      History of Present Illness   HPI:    69-year-old gentleman here for follow-up, in the past he had a history of portal pulmonary hypertension, was evaluated at the [facility] for treatment, was on Opsumit, macitentan, sildenafil in the past, had a right heart catheterization previously as well and was evaluated for transplant.    He self discontinued his medications and now establish care with our group.  We performed a VQ scan, HRCT, PFTs with walk test and ordered a right heart catheterization.  He declined a right heart catheterization.    He was seen by primary care they placed him on LAMA LABA therapy and he feels like that is helping him significantly.  We discussed today oxygen therapy and right heart catheterization, he declines politely and says he does not want this.  He does not want to do further workup at this time.    He has been staying out of the hospital, refraining from cigarettes.  He seems to be doing clinically okay so far.    Review of Systems   Constitutional:  Positive for fatigue.   HENT: Negative.     Eyes: Negative.    Respiratory:  Positive for shortness of breath.    Cardiovascular: Negative.    Gastrointestinal: Negative.    Endocrine: Negative.    Genitourinary: Negative.    Musculoskeletal: Negative.    Skin: Negative.    Allergic/Immunologic: Negative.    Neurological:  Positive for dizziness.   Hematological: Negative.    Psychiatric/Behavioral: Negative.          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    • Psoriasis    • Pulmonary HTN (HCC)    • Thrombocytopenia (HCC)      Past Surgical History:   Procedure Laterality Date   • CHOLECYSTECTOMY       Family History   Problem Relation Age of Onset   • Diabetes Mother    • Coronary artery disease Mother    • Stroke Father         Major strokes x2    • Diabetes Sister      Social History     Tobacco Use   Smoking Status Former   • Current packs/day: 0.00   • Types: Cigarettes   • Quit date:    • Years since quittin.4   Smokeless Tobacco Never       Occupational History: NA    Meds/Allergies     Current Outpatient Medications:   •  salmeterol (Serevent Diskus) 50 mcg/act diskus inhaler, Inhale 1 puff 2 (two) times a day, Disp: 180 blister, Rfl: 1  •  spironolactone (ALDACTONE) 25 mg tablet, Take 2 tablets (50 mg total) by mouth daily, Disp: 180 tablet, Rfl: 0  •  tiotropium (Spiriva Respimat) 1.25 MCG/ACT AERS inhaler, Inhale 2 puffs daily, Disp: 12 g, Rfl: 1  •  albuterol (Ventolin HFA) 90 mcg/act inhaler, Inhale 2 puffs every 4 (four) hours as needed for wheezing (cough) (Patient not taking: Reported on 6/3/2024), Disp: 18 g, Rfl: 0  •  benzonatate (TESSALON PERLES) 100 mg capsule, Take 1 capsule (100 mg total) by mouth 3 (three) times a day as needed for cough (Patient not taking: Reported on 6/3/2024), Disp: 20 capsule, Rfl: 0  •  Diclofenac Sodium (VOLTAREN) 1 %, Apply 2 g topically 4 (four) times a day (Patient not taking: Reported on 6/3/2024), Disp: 150 g, Rfl: 3  •  fluticasone (FLONASE) 50 mcg/act nasal spray, 1 spray into each nostril  "daily (Patient not taking: Reported on 6/3/2024), Disp: 9.9 mL, Rfl: 0  No Known Allergies    Vitals: Blood pressure 142/82, pulse 87, temperature (!) 97.2 °F (36.2 °C), temperature source Tympanic, height 5' 7\" (1.702 m), weight 68.5 kg (151 lb), SpO2 92%., Body mass index is 23.65 kg/m². Oxygen Therapy  SpO2: 92 %    Physical Exam:    GEN: alert and oriented x 3, pleasant and cooperative   HEENT:  Normocephalic, atraumatic, anicteric  NECK: No JVD   HEART: Rate, normal S1 and S2  LUNGS: Crackles right lower lung, left lung clear  ABDOMEN:  Normoactive bowel sounds, soft, no tenderness, no distention  EXTREMITIES: peripheral pulses palpable; no edema  NEURO: no gross focal findings; cranial nerves grossly intact   SKIN:  Dry, intact, warm to touch    Labs: I have personally reviewed pertinent lab results.    No results found for: \"IGE\"    Imaging and other studies: I have personally reviewed pertinent films in PACS  CT chest with bronchiectasis predominantly right upper and right lower, no signs of ILD right now    Pulmonary function testing:  Personally interpreted by me  Obstruction on PFT    Other Studies: I have personally reviewed pertinent films in PACS  HRCT  VQ scan with RUL abnormality but no definitive PE     Dacia Healy MD  Pulmonary and Critical Care Medicine     "

## 2024-06-10 ENCOUNTER — TELEPHONE (OUTPATIENT)
Age: 69
End: 2024-06-10

## 2024-06-10 NOTE — TELEPHONE ENCOUNTER
Spoke with pt was not sure which medication he needs prior auth stated will call back with name of medication

## 2024-06-10 NOTE — TELEPHONE ENCOUNTER
Patient called in requesting to speak with Provider directly regarding a medication. Please advise.

## 2024-06-10 NOTE — TELEPHONE ENCOUNTER
Patient returned call regarding medication clarification. Patient was recently prescribed Rx: Sameterol Inhaler and Rx: Tiotropium. Pt states inhalers are not covered by insurance. Requesting alt medication.    Please review and advise.  Thank You

## 2024-06-11 LAB — COLOGUARD RESULT REPORTABLE: POSITIVE

## 2024-06-13 DIAGNOSIS — R19.5 POSITIVE COLORECTAL CANCER SCREENING USING COLOGUARD TEST: Primary | ICD-10-CM

## 2024-06-13 NOTE — TELEPHONE ENCOUNTER
Patient would like to know why his prescriptions for the inhalers were not covered by his insurance. He is requesting that a prior auth be done and would like a call back.

## 2024-06-20 NOTE — TELEPHONE ENCOUNTER
Patient calling requesting update medication Inhalers not being covered by insurance fully. Patient has a mercedes copay un able to afford.    Please review and advise.  Thank You.

## 2024-06-26 ENCOUNTER — TELEPHONE (OUTPATIENT)
Age: 69
End: 2024-06-26

## 2024-06-26 NOTE — TELEPHONE ENCOUNTER
Pt called to see if he has an appt scheduled. I let the pt know he does not but I would be happy to make him one. Pt said no and disconnected

## 2024-07-24 DIAGNOSIS — J43.9 MIXED RESTRICTIVE AND OBSTRUCTIVE LUNG DISEASE  (HCC): Primary | ICD-10-CM

## 2024-07-24 DIAGNOSIS — J98.4 MIXED RESTRICTIVE AND OBSTRUCTIVE LUNG DISEASE  (HCC): Primary | ICD-10-CM

## 2024-07-24 RX ORDER — FLUTICASONE PROPIONATE AND SALMETEROL 55; 14 UG/1; UG/1
1 POWDER, METERED RESPIRATORY (INHALATION) 2 TIMES DAILY
Qty: 1 EACH | Refills: 3 | Status: SHIPPED | OUTPATIENT
Start: 2024-07-24 | End: 2024-10-22

## 2024-07-29 DIAGNOSIS — I10 PRIMARY HYPERTENSION: ICD-10-CM

## 2024-07-30 RX ORDER — SPIRONOLACTONE 25 MG/1
50 TABLET ORAL DAILY
Qty: 180 TABLET | Refills: 1 | Status: SHIPPED | OUTPATIENT
Start: 2024-07-30

## 2024-09-26 ENCOUNTER — OFFICE VISIT (OUTPATIENT)
Age: 69
End: 2024-09-26
Payer: COMMERCIAL

## 2024-09-26 VITALS
TEMPERATURE: 98 F | WEIGHT: 152.4 LBS | OXYGEN SATURATION: 90 % | SYSTOLIC BLOOD PRESSURE: 140 MMHG | BODY MASS INDEX: 23.87 KG/M2 | HEART RATE: 91 BPM | DIASTOLIC BLOOD PRESSURE: 82 MMHG

## 2024-09-26 DIAGNOSIS — Z23 ENCOUNTER FOR IMMUNIZATION: ICD-10-CM

## 2024-09-26 DIAGNOSIS — J40 BRONCHITIS: ICD-10-CM

## 2024-09-26 DIAGNOSIS — E78.5 HYPERLIPIDEMIA, UNSPECIFIED HYPERLIPIDEMIA TYPE: ICD-10-CM

## 2024-09-26 DIAGNOSIS — Z13.1 DIABETES MELLITUS SCREENING: ICD-10-CM

## 2024-09-26 DIAGNOSIS — Z00.00 ENCOUNTER FOR MEDICARE ANNUAL WELLNESS EXAM: Primary | ICD-10-CM

## 2024-09-26 DIAGNOSIS — J43.9 MIXED RESTRICTIVE AND OBSTRUCTIVE LUNG DISEASE  (HCC): ICD-10-CM

## 2024-09-26 DIAGNOSIS — I10 PRIMARY HYPERTENSION: ICD-10-CM

## 2024-09-26 DIAGNOSIS — D69.6 THROMBOCYTOPENIA (HCC): ICD-10-CM

## 2024-09-26 DIAGNOSIS — Z13.220 SCREENING CHOLESTEROL LEVEL: ICD-10-CM

## 2024-09-26 DIAGNOSIS — J98.4 MIXED RESTRICTIVE AND OBSTRUCTIVE LUNG DISEASE  (HCC): ICD-10-CM

## 2024-09-26 DIAGNOSIS — R19.5 POSITIVE COLORECTAL CANCER SCREENING USING COLOGUARD TEST: ICD-10-CM

## 2024-09-26 DIAGNOSIS — I27.20 PULMONARY HYPERTENSION (HCC): ICD-10-CM

## 2024-09-26 DIAGNOSIS — K74.60 HEPATIC CIRRHOSIS, UNSPECIFIED HEPATIC CIRRHOSIS TYPE, UNSPECIFIED WHETHER ASCITES PRESENT (HCC): ICD-10-CM

## 2024-09-26 PROCEDURE — 90662 IIV NO PRSV INCREASED AG IM: CPT

## 2024-09-26 PROCEDURE — G0008 ADMIN INFLUENZA VIRUS VAC: HCPCS

## 2024-09-26 PROCEDURE — 99214 OFFICE O/P EST MOD 30 MIN: CPT | Performed by: STUDENT IN AN ORGANIZED HEALTH CARE EDUCATION/TRAINING PROGRAM

## 2024-09-26 PROCEDURE — G0439 PPPS, SUBSEQ VISIT: HCPCS | Performed by: STUDENT IN AN ORGANIZED HEALTH CARE EDUCATION/TRAINING PROGRAM

## 2024-09-26 RX ORDER — ALBUTEROL SULFATE 90 UG/1
2 INHALANT RESPIRATORY (INHALATION) EVERY 4 HOURS PRN
Qty: 18 G | Refills: 0 | Status: SHIPPED | OUTPATIENT
Start: 2024-09-26

## 2024-09-26 NOTE — ASSESSMENT & PLAN NOTE
Orders:    tiotropium-olodaterol (STIOLTO RESPIMAT) 2.5-2.5 MCG/ACT inhaler; Inhale 2 puffs daily

## 2024-09-26 NOTE — PROGRESS NOTES
Ambulatory Visit  Name: Eduardo Joiner      : 1955      MRN: [id number]  Encounter Provider: Mary Lee MD  Encounter Date: 2024   Encounter department: [facility]  Encounter for Medicare annual wellness exam         Mixed restrictive and obstructive lung disease  (HCC)    Orders:    tiotropium-olodaterol (STIOLTO RESPIMAT) 2.5-2.5 MCG/ACT inhaler; Inhale 2 puffs daily    Bronchitis    Orders:    albuterol (Ventolin HFA) 90 mcg/act inhaler; Inhale 2 puffs every 4 (four) hours as needed for wheezing (cough)    Positive colorectal cancer screening using Cologuard test    Orders:    Ambulatory Referral to Gastroenterology; Future    Encounter for immunization    Orders:    influenza vaccine, high-dose, PF 0.5 mL (Fluzone High Dose)    Hepatic cirrhosis, unspecified hepatic cirrhosis type, unspecified whether ascites present (HCC)    Orders:    Protime-INR; Future    Comprehensive metabolic panel; Future    CBC and differential; Future    Thrombocytopenia (HCC)    Orders:    Protime-INR; Future    Comprehensive metabolic panel; Future    CBC and differential; Future    Screening cholesterol level    Orders:    Lipid panel; Future    Diabetes mellitus screening    Orders:    Hemoglobin A1C; Future    Primary hypertension  BP elevated today but normal at last visit.  Patient encouraged to keep home log and rtc in 3 months.        Pulmonary hypertension (HCC)         Hyperlipidemia, unspecified hyperlipidemia type  Patient uninterested in statin at this time.  Repeat lipid panel        Return for Recheck BP.    Preventive health issues were discussed with patient, and age appropriate screening tests were ordered as noted in patient's After Visit Summary. Personalized health advice and appropriate referrals for health education or preventive services given if needed, as noted in patient's After Visit Summary.    History of Present Illness     Patient  presents for AMW visit. He has no complaints or concerns.  Felt stiolto worked better for COPD than spriva and are same price.  Will switch back to stiolto.     Patient had positive cologuard.  After conversation patient now agreeable to GI referral for colonoscopy.        Patient Care Team:  Mary Uriostegui MD as PCP - General (Family Medicine)  Shania Greer DO as PCP - [facility]CP-[facility] ([facility])    Review of Systems   Constitutional:  Negative for chills and fever.   HENT:  Negative for ear pain and sore throat.    Eyes:  Negative for pain and visual disturbance.   Respiratory:  Negative for cough and shortness of breath.    Cardiovascular:  Negative for chest pain and palpitations.   Gastrointestinal:  Negative for abdominal pain and vomiting.   Genitourinary:  Negative for dysuria and hematuria.   Musculoskeletal:  Negative for arthralgias and back pain.   Skin:  Negative for color change and rash.   Neurological:  Negative for seizures and syncope.   All other systems reviewed and are negative.    Medical History Reviewed by provider this encounter:       Annual Wellness Visit Questionnaire   Eduardo is here for his Initial Wellness visit. Last Medicare Wellness visit information reviewed, patient interviewed and updates made to the record today.      Health Risk Assessment:   Patient rates overall health as good. Patient feels that their physical health rating is much better. Patient is very satisfied with their life. Eyesight was rated as same. Hearing was rated as same. Patient feels that their emotional and mental health rating is same. Patients states they are never, rarely angry. Patient states they are often unusually tired/fatigued. Pain experienced in the last 7 days has been none. Patient states that he has experienced no weight loss or gain in last 6 months.     Depression Screening:   PHQ-2 Score: 0      Fall Risk Screening:   In the past year, patient has experienced: no history of  falling in past year      Home Safety:  Patient does not have trouble with stairs inside or outside of their home. Patient has working smoke alarms and has working carbon monoxide detector. Home safety hazards include: none.     Nutrition:   Current diet is Regular.     Medications:   Patient is not currently taking any over-the-counter supplements. Patient is able to manage medications.     Activities of Daily Living (ADLs)/Instrumental Activities of Daily Living (IADLs):   Walk and transfer into and out of bed and chair?: Yes  Dress and groom yourself?: Yes    Bathe or shower yourself?: Yes    Feed yourself? Yes  Do your laundry/housekeeping?: Yes  Manage your money, pay your bills and track your expenses?: Yes  Make your own meals?: Yes    Do your own shopping?: Yes    Previous Hospitalizations:   Any hospitalizations or ED visits within the last 12 months?: No      Advance Care Planning:   Living will: Yes    Advanced directive: Yes      PREVENTIVE SCREENINGS      Cardiovascular Screening:    General: Screening Not Indicated and History Lipid Disorder      Diabetes Screening:     General: Screening Current      Colorectal Cancer Screening:     General: Screening Current      Abdominal Aortic Aneurysm (AAA) Screening:    Risk factors include: age between 65-74 yo and tobacco use        Lung Cancer Screening:     General: Screening Not Indicated      Hepatitis C Screening:    General: Screening Not Indicated and History Hepatitis C    Screening, Brief Intervention, and Referral to Treatment (SBIRT)    Screening  Typical number of drinks in a day: 0  Typical number of drinks in a week: 1  Interpretation: Low risk drinking behavior.    Single Item Drug Screening:  How often have you used an illegal drug (including marijuana) or a prescription medication for non-medical reasons in the past year? never    Single Item Drug Screen Score: 0  Interpretation: Negative screen for possible drug use disorder    Social  Determinants of Health     Financial Resource Strain: Patient Declined (9/21/2023)    Overall Financial Resource Strain (CARDIA)     Difficulty of Paying Living Expenses: Patient declined   Food Insecurity: No Food Insecurity (9/26/2024)    Hunger Vital Sign     Worried About Running Out of Food in the Last Year: Never true     Ran Out of Food in the Last Year: Never true   Transportation Needs: No Transportation Needs (9/26/2024)    PRAPARE - Transportation     Lack of Transportation (Medical): No     Lack of Transportation (Non-Medical): No   Housing Stability: Low Risk  (9/26/2024)    Housing Stability Vital Sign     Unable to Pay for Housing in the Last Year: No     Number of Times Moved in the Last Year: 0     Homeless in the Last Year: No   Utilities: Not At Risk (9/26/2024)    [facility] Utilities     Threatened with loss of utilities: No     No results found.    Objective     /82   Pulse 91   Temp 98 °F (36.7 °C)   Wt 69.1 kg (152 lb 6.4 oz)   SpO2 90%   BMI 23.87 kg/m²     Physical Exam  Vitals and nursing note reviewed.   Constitutional:       General: He is not in acute distress.     Appearance: He is well-developed.   HENT:      Head: Normocephalic and atraumatic.   Eyes:      Conjunctiva/sclera: Conjunctivae normal.   Cardiovascular:      Rate and Rhythm: Normal rate and regular rhythm.      Heart sounds: No murmur heard.  Pulmonary:      Effort: No respiratory distress.      Comments: Decreased breath sounds all fields  Abdominal:      Palpations: Abdomen is soft.      Tenderness: There is no abdominal tenderness.   Musculoskeletal:         General: No swelling.      Cervical back: Neck supple.   Skin:     General: Skin is warm and dry.      Capillary Refill: Capillary refill takes less than 2 seconds.   Neurological:      Mental Status: He is alert.   Psychiatric:         Mood and Affect: Mood normal.

## 2024-09-26 NOTE — ASSESSMENT & PLAN NOTE
BP elevated today but normal at last visit.  Patient encouraged to keep home log and rtc in 3 months.

## 2024-09-26 NOTE — ASSESSMENT & PLAN NOTE
Orders:    Protime-INR; Future    Comprehensive metabolic panel; Future    CBC and differential; Future

## 2024-09-30 ENCOUNTER — TELEPHONE (OUTPATIENT)
Age: 69
End: 2024-09-30

## 2024-09-30 NOTE — TELEPHONE ENCOUNTER
I called back Mr. Joiner but was unable to reach him.  Left message to call back clinic.  Patient should discontinue airduo and sterevent diskus as discussed at his most recent appointment and restart stiolto.    Mary Lee MD

## 2024-09-30 NOTE — TELEPHONE ENCOUNTER
Eduardo would like to know what should he do with the medication he has prior to his  new medication. He would like to also discuss future appointments that are suppose to be scheduled for him. He would like to speak with Dr. Uriostegui.

## 2024-10-03 ENCOUNTER — APPOINTMENT (OUTPATIENT)
Dept: LAB | Facility: CLINIC | Age: 69
End: 2024-10-03
Payer: COMMERCIAL

## 2024-10-03 DIAGNOSIS — D69.6 THROMBOCYTOPENIA (HCC): ICD-10-CM

## 2024-10-03 DIAGNOSIS — I10 PRIMARY HYPERTENSION: ICD-10-CM

## 2024-10-03 DIAGNOSIS — Z13.1 DIABETES MELLITUS SCREENING: ICD-10-CM

## 2024-10-03 DIAGNOSIS — Z13.220 SCREENING CHOLESTEROL LEVEL: ICD-10-CM

## 2024-10-03 DIAGNOSIS — K74.60 HEPATIC CIRRHOSIS, UNSPECIFIED HEPATIC CIRRHOSIS TYPE, UNSPECIFIED WHETHER ASCITES PRESENT (HCC): ICD-10-CM

## 2024-10-03 LAB
ALBUMIN SERPL BCG-MCNC: 3.2 G/DL (ref 3.5–5)
ALP SERPL-CCNC: 62 U/L (ref 34–104)
ALT SERPL W P-5'-P-CCNC: 29 U/L (ref 7–52)
ANION GAP SERPL CALCULATED.3IONS-SCNC: 6 MMOL/L (ref 4–13)
ANISOCYTOSIS BLD QL SMEAR: PRESENT
AST SERPL W P-5'-P-CCNC: 42 U/L (ref 13–39)
BASOPHILS # BLD AUTO: 0.07 THOUSANDS/ΜL (ref 0–0.1)
BASOPHILS NFR BLD AUTO: 1 % (ref 0–1)
BILIRUB SERPL-MCNC: 4.91 MG/DL (ref 0.2–1)
BUN SERPL-MCNC: 10 MG/DL (ref 5–25)
CALCIUM ALBUM COR SERPL-MCNC: 9.7 MG/DL (ref 8.3–10.1)
CALCIUM SERPL-MCNC: 9.1 MG/DL (ref 8.4–10.2)
CHLORIDE SERPL-SCNC: 103 MMOL/L (ref 96–108)
CHOLEST SERPL-MCNC: 152 MG/DL
CO2 SERPL-SCNC: 26 MMOL/L (ref 21–32)
CREAT SERPL-MCNC: 0.9 MG/DL (ref 0.6–1.3)
EOSINOPHIL # BLD AUTO: 0.4 THOUSAND/ΜL (ref 0–0.61)
EOSINOPHIL NFR BLD AUTO: 7 % (ref 0–6)
ERYTHROCYTE [DISTWIDTH] IN BLOOD BY AUTOMATED COUNT: 14.4 % (ref 11.6–15.1)
EST. AVERAGE GLUCOSE BLD GHB EST-MCNC: 103 MG/DL
GFR SERPL CREATININE-BSD FRML MDRD: 86 ML/MIN/1.73SQ M
GLUCOSE P FAST SERPL-MCNC: 121 MG/DL (ref 65–99)
HBA1C MFR BLD: 5.2 %
HCT VFR BLD AUTO: 47.9 % (ref 36.5–49.3)
HDLC SERPL-MCNC: 47 MG/DL
HGB BLD-MCNC: 16.7 G/DL (ref 12–17)
IMM GRANULOCYTES # BLD AUTO: 0.01 THOUSAND/UL (ref 0–0.2)
IMM GRANULOCYTES NFR BLD AUTO: 0 % (ref 0–2)
INR PPP: 1.45 (ref 0.85–1.19)
LDLC SERPL CALC-MCNC: 92 MG/DL (ref 0–100)
LYMPHOCYTES # BLD AUTO: 1.31 THOUSANDS/ΜL (ref 0.6–4.47)
LYMPHOCYTES NFR BLD AUTO: 24 % (ref 14–44)
MCH RBC QN AUTO: 32.9 PG (ref 26.8–34.3)
MCHC RBC AUTO-ENTMCNC: 34.9 G/DL (ref 31.4–37.4)
MCV RBC AUTO: 95 FL (ref 82–98)
MONOCYTES # BLD AUTO: 0.56 THOUSAND/ΜL (ref 0.17–1.22)
MONOCYTES NFR BLD AUTO: 10 % (ref 4–12)
NEUTROPHILS # BLD AUTO: 3.02 THOUSANDS/ΜL (ref 1.85–7.62)
NEUTS SEG NFR BLD AUTO: 58 % (ref 43–75)
NONHDLC SERPL-MCNC: 105 MG/DL
NRBC BLD AUTO-RTO: 0 /100 WBCS
PLATELET # BLD AUTO: 52 THOUSANDS/UL (ref 149–390)
PLATELET BLD QL SMEAR: ABNORMAL
PMV BLD AUTO: 8.3 FL (ref 8.9–12.7)
POTASSIUM SERPL-SCNC: 4.8 MMOL/L (ref 3.5–5.3)
PROT SERPL-MCNC: 6.9 G/DL (ref 6.4–8.4)
PROTHROMBIN TIME: 17.9 SECONDS (ref 12.3–15)
RBC # BLD AUTO: 5.07 MILLION/UL (ref 3.88–5.62)
RBC MORPH BLD: PRESENT
SODIUM SERPL-SCNC: 135 MMOL/L (ref 135–147)
TRIGL SERPL-MCNC: 67 MG/DL
WBC # BLD AUTO: 5.37 THOUSAND/UL (ref 4.31–10.16)

## 2024-10-03 PROCEDURE — 80061 LIPID PANEL: CPT

## 2024-10-03 PROCEDURE — 36415 COLL VENOUS BLD VENIPUNCTURE: CPT

## 2024-10-03 PROCEDURE — 83036 HEMOGLOBIN GLYCOSYLATED A1C: CPT

## 2024-10-03 PROCEDURE — 85610 PROTHROMBIN TIME: CPT

## 2024-10-03 PROCEDURE — 85025 COMPLETE CBC W/AUTO DIFF WBC: CPT

## 2024-10-03 PROCEDURE — 80053 COMPREHEN METABOLIC PANEL: CPT

## 2024-10-07 NOTE — TELEPHONE ENCOUNTER
Pt called. Returning missed call from Provider. Pt states that he already purchased the airduo and sterevent diskus  as well as the stiolto. Pt would like to use up the airduo and sterevent diskus first, then start the stiolto because he paid $47 out of pocket.     Please inform PCP and advise Pt further on medication. Thank you

## 2025-01-16 ENCOUNTER — OFFICE VISIT (OUTPATIENT)
Age: 70
End: 2025-01-16
Payer: COMMERCIAL

## 2025-01-16 VITALS
HEART RATE: 93 BPM | DIASTOLIC BLOOD PRESSURE: 70 MMHG | OXYGEN SATURATION: 90 % | HEIGHT: 67 IN | BODY MASS INDEX: 24.48 KG/M2 | WEIGHT: 156 LBS | SYSTOLIC BLOOD PRESSURE: 142 MMHG | TEMPERATURE: 97.8 F

## 2025-01-16 DIAGNOSIS — Z12.5 ENCOUNTER FOR SCREENING FOR MALIGNANT NEOPLASM OF PROSTATE: ICD-10-CM

## 2025-01-16 DIAGNOSIS — I27.20 PULMONARY HYPERTENSION (HCC): ICD-10-CM

## 2025-01-16 DIAGNOSIS — D69.6 THROMBOCYTOPENIA (HCC): ICD-10-CM

## 2025-01-16 DIAGNOSIS — K74.60 HEPATIC CIRRHOSIS, UNSPECIFIED HEPATIC CIRRHOSIS TYPE, UNSPECIFIED WHETHER ASCITES PRESENT (HCC): ICD-10-CM

## 2025-01-16 DIAGNOSIS — J43.9 MIXED RESTRICTIVE AND OBSTRUCTIVE LUNG DISEASE  (HCC): ICD-10-CM

## 2025-01-16 DIAGNOSIS — I10 PRIMARY HYPERTENSION: Primary | ICD-10-CM

## 2025-01-16 DIAGNOSIS — R35.1 NOCTURIA: ICD-10-CM

## 2025-01-16 DIAGNOSIS — J98.4 MIXED RESTRICTIVE AND OBSTRUCTIVE LUNG DISEASE  (HCC): ICD-10-CM

## 2025-01-16 PROCEDURE — 99214 OFFICE O/P EST MOD 30 MIN: CPT | Performed by: STUDENT IN AN ORGANIZED HEALTH CARE EDUCATION/TRAINING PROGRAM

## 2025-01-16 RX ORDER — SPIRONOLACTONE 25 MG/1
50 TABLET ORAL DAILY
Qty: 180 TABLET | Refills: 1 | Status: SHIPPED | OUTPATIENT
Start: 2025-01-16

## 2025-01-16 NOTE — PROGRESS NOTES
Name: Eduardo Joiner      : 1955      MRN: [id number]  Encounter Provider: Mary Lee MD  Encounter Date: 2025   [facility]  :  Assessment & Plan  Primary hypertension  - BP in office today 142/70, similar on recheck during visit  - Discussed checking BP at home and keeping log of readings. Instructed to bring this log to next visit   - CBC, CMP ordered to watch for metabolic disturbances regarding spironolactone   Orders:    spironolactone (ALDACTONE) 25 mg tablet; Take 2 tablets (50 mg total) by mouth daily    Comprehensive metabolic panel; Future    Mixed restrictive and obstructive lung disease  (HCC)  - Denies new cough, wheeze, SOB, orthopnea. Stiolto working well per patient, needs refill  Orders:    tiotropium-olodaterol (STIOLTO RESPIMAT) 2.5-2.5 MCG/ACT inhaler; Inhale 2 puffs daily    CBC and differential; Future    Nocturia  - Reports nocturia, two times per night, a few times per week, which is new for him  -Discussed rechecking PSA  Orders:    PSA, Total Screen; Future    CBC and differential; Future    Hepatic cirrhosis, unspecified hepatic cirrhosis type, unspecified whether ascites present (HCC)  Patient has seen gastroenterology and hepatology, he is not interested in further workup at this time.  Continue spironolactone at current dose.       Pulmonary hypertension (HCC)  Continue Stiolto       Thrombocytopenia (HCC)  Recheck CBC       Encounter for screening for malignant neoplasm of prostate    Orders:    PSA, Total Screen; Future           History of Present Illness     Eduardo is a 68 y/o male presenting for HTN follow up. He states that he has been getting up out of bed to go to the bathroom, usually two times per night, a few times each week, which is new for him. Denies urinary urgency, problems with urinary voiding, bowel or bladder incontinence, hematuria. Denies n/v, fever, chills, diarrhea, hematochezia, recent  "falls/injuries/trauma. Patient endorses fatigue after doing household chores, which is not changed from his baseline, and able to be resolved by taking a break or drinking water. Denies, cough, wheeze, SOB, orthopnea, lower extremity swelling, chest pain, palpitations, blurry vision.       Review of Systems   Constitutional:  Positive for fatigue. Negative for chills and fever.   Eyes:  Negative for visual disturbance.   Respiratory:  Negative for cough, shortness of breath and wheezing.    Cardiovascular:  Negative for chest pain, palpitations and leg swelling.   Gastrointestinal:  Negative for blood in stool, diarrhea, nausea and vomiting.   Genitourinary:  Positive for frequency. Negative for difficulty urinating, dysuria, hematuria and urgency.   Neurological:  Negative for dizziness, light-headedness and headaches.       Objective   /70   Pulse 93   Temp 97.8 °F (36.6 °C)   Ht 5' 7\" (1.702 m)   Wt 70.8 kg (156 lb)   SpO2 90%   BMI 24.43 kg/m²      Physical Exam  Constitutional:       General: He is not in acute distress.     Appearance: Normal appearance. He is not toxic-appearing.   Cardiovascular:      Rate and Rhythm: Normal rate and regular rhythm.      Heart sounds: Normal heart sounds. No murmur heard.     No gallop.   Pulmonary:      Effort: Pulmonary effort is normal. No respiratory distress.      Breath sounds: Normal breath sounds. No wheezing or rales.   Musculoskeletal:      Right lower leg: No edema.      Left lower leg: No edema.   Neurological:      Mental Status: He is alert.   Psychiatric:         Mood and Affect: Mood normal.         Behavior: Behavior normal.         "

## 2025-01-16 NOTE — ASSESSMENT & PLAN NOTE
- BP in office today 142/70, similar on recheck during visit  - Discussed checking BP at home and keeping log of readings. Instructed to bring this log to next visit   - CBC, CMP ordered to watch for metabolic disturbances regarding spironolactone   Orders:    spironolactone (ALDACTONE) 25 mg tablet; Take 2 tablets (50 mg total) by mouth daily    Comprehensive metabolic panel; Future    
- Denies new cough, wheeze, SOB, orthopnea. Stiolto working well per patient, needs refill  Orders:    tiotropium-olodaterol (STIOLTO RESPIMAT) 2.5-2.5 MCG/ACT inhaler; Inhale 2 puffs daily    CBC and differential; Future    
Continue Stiolto       
Patient has seen gastroenterology and hepatology, he is not interested in further workup at this time.  Continue spironolactone at current dose.       
Recheck CBC       
dietitian/nutrition services

## 2025-01-23 ENCOUNTER — PROCEDURE VISIT (OUTPATIENT)
Age: 70
End: 2025-01-23
Payer: COMMERCIAL

## 2025-01-23 VITALS
OXYGEN SATURATION: 98 % | TEMPERATURE: 98.4 F | WEIGHT: 156.6 LBS | HEART RATE: 89 BPM | BODY MASS INDEX: 24.58 KG/M2 | HEIGHT: 67 IN | SYSTOLIC BLOOD PRESSURE: 144 MMHG | DIASTOLIC BLOOD PRESSURE: 84 MMHG

## 2025-01-23 DIAGNOSIS — L82.1 SEBORRHEIC KERATOSIS: ICD-10-CM

## 2025-01-23 DIAGNOSIS — L91.8 SKIN TAG: Primary | ICD-10-CM

## 2025-01-23 PROCEDURE — 11102 TANGNTL BX SKIN SINGLE LES: CPT

## 2025-01-23 NOTE — PROGRESS NOTES
Biopsy    Date/Time: 1/23/2025 2:00 PM    Performed by: Marisol Reyes MD  Authorized by: Marisol Reyes MD  Universal Protocol:  Procedure performed by: (Dr. Greer)  Consent: Verbal consent obtained. Written consent obtained.  Risks and benefits: risks, benefits and alternatives were discussed  Consent given by: patient  Patient understanding: patient states understanding of the procedure being performed  Patient identity confirmed: verbally with patient    Procedure Details - Lesion Biopsy:     Body area:  Head/neck    Head/neck location:  R cheek    Biopsy method: shave biopsy      Biopsy tissue type: skin    Malignancy: benign lesion       Single skin tag removal on patient's right cheek, no concern for malignancy. Area was cleaned with alcohol, anesthetized with approximately 0.5 mL lidocaine. Skin tag was removed with forceps and shave biopsy blade due to nature of skin tag overlaying seborrheic keratosis. Hemostasis adequately achieved with silver nitrate. Area cleaned and dressed with alcohol and single small bandage. Patient tolerated procedure well with no reported pain. Advised to change bandage in 1-2 days, allowing the area to be cleaned with soap and water but to avoid friction or direct rubbing, provided additional bandages. Discussed with patient to return to clinic if any signs of infection present. Patient expresses understanding.

## 2025-01-23 NOTE — PROGRESS NOTES
Skin tag removal    Date/Time: 1/23/2025 2:00 PM    Performed by: Marisol Reyes MD  Authorized by: Marisol Reyes MD  Universal Protocol:  Procedure performed by:  Consent: Verbal consent obtained. Written consent obtained.  Risks and benefits: risks, benefits and alternatives were discussed  Consent given by: patient  Patient understanding: patient states understanding of the procedure being performed  Patient identity confirmed: verbally with patient      Procedure Details - Skin Tag Destruction:     Up to 15      Body area:  Head/neck    Head/neck location:  R cheek    Destruction method comment:  Shave biopsy    Cosmetic?: Yes    Lesion 6:      Single skin tag removal on patient's right cheek, no concern for malignancy. Area was cleaned with alcohol, anesthetized with approximately 0.5 mL lidocaine. Skin tag was removed with forceps and shave biopsy blade due to nature of skin tag overlaying seborrheic keratosis. Hemostasis adequately achieved with silver nitrate. Area cleaned and dressed with alcohol and single small bandage. Patient tolerated procedure well with no reported pain. Advised to change bandage in 1-2 days, allowing the area to be cleaned with soap and water but to avoid friction or direct rubbing, provided additional bandages. Discussed with patient to return to clinic if any signs of infection present. Patient expresses understanding.  Biopsy    Date/Time: 1/23/2025 2:00 PM    Performed by: Marisol Reyes MD  Authorized by: Marisol Reyes MD  Universal Protocol:  Procedure performed by: (Dr. Greer)  Consent: Verbal consent obtained. Written consent obtained.  Risks and benefits: risks, benefits and alternatives were discussed  Consent given by: patient  Patient understanding: patient states understanding of the procedure being performed  Patient identity confirmed: verbally with patient    Procedure Details - Lesion Biopsy:     Body area:  Head/neck    Head/neck location:  R cheek    Biopsy method:  shave biopsy      Biopsy tissue type: skin    Malignancy: benign lesion      Cosmetic?: Yes       Single skin tag removal on patient's right cheek, no concern for malignancy. Area was cleaned with alcohol, anesthetized with approximately 0.5 mL lidocaine. Skin tag was removed with forceps and shave biopsy blade due to nature of skin tag overlaying seborrheic keratosis. Hemostasis adequately achieved with silver nitrate. Area cleaned and dressed with alcohol and single small bandage. Patient tolerated procedure well with no reported pain. Advised to change bandage in 1-2 days, allowing the area to be cleaned with soap and water but to avoid friction or direct rubbing, provided additional bandages. Discussed with patient to return to clinic if any signs of infection present. Patient expresses understanding.

## 2025-03-07 DIAGNOSIS — J43.9 MIXED RESTRICTIVE AND OBSTRUCTIVE LUNG DISEASE  (HCC): ICD-10-CM

## 2025-03-07 DIAGNOSIS — J98.4 MIXED RESTRICTIVE AND OBSTRUCTIVE LUNG DISEASE  (HCC): ICD-10-CM

## 2025-03-07 NOTE — TELEPHONE ENCOUNTER
Not a duplicate - Pharmacy Change     Reason for call:   [x] Refill   [] Prior Auth  [] Other:     Office:   [x] PCP/Provider - Mary Uriostegui MD   [] Specialty/Provider -     Medication: tiotropium-olodaterol (STIOLTO RESPIMAT) 2.5-2.5 MCG/ACT inhaler / Inhale 2 puffs daily     Pharmacy: [facility] [address] [place] [address]     Local Pharmacy   Does the patient have enough for 3 days?   [x] Yes   [] No - Send as HP to POD

## 2025-03-28 ENCOUNTER — RA CDI HCC (OUTPATIENT)
Dept: OTHER | Facility: HOSPITAL | Age: 70
End: 2025-03-28

## 2025-03-28 NOTE — PROGRESS NOTES
HCC coding opportunities       Chart reviewed, no opportunity found: CHART REVIEWED, NO OPPORTUNITY FOUND   [employer] audit     Patients Insurance     Medicare Insurance: [employer] Advantage

## 2025-04-24 ENCOUNTER — OFFICE VISIT (OUTPATIENT)
Age: 70
End: 2025-04-24
Payer: COMMERCIAL

## 2025-04-24 VITALS
DIASTOLIC BLOOD PRESSURE: 70 MMHG | BODY MASS INDEX: 25.33 KG/M2 | HEIGHT: 67 IN | SYSTOLIC BLOOD PRESSURE: 142 MMHG | HEART RATE: 90 BPM | OXYGEN SATURATION: 92 % | TEMPERATURE: 97.6 F | WEIGHT: 161.4 LBS

## 2025-04-24 DIAGNOSIS — I10 PRIMARY HYPERTENSION: Primary | ICD-10-CM

## 2025-04-24 DIAGNOSIS — R25.2 LEG CRAMPS: ICD-10-CM

## 2025-04-24 PROCEDURE — 99214 OFFICE O/P EST MOD 30 MIN: CPT | Performed by: STUDENT IN AN ORGANIZED HEALTH CARE EDUCATION/TRAINING PROGRAM

## 2025-04-24 RX ORDER — HYDROCHLOROTHIAZIDE 12.5 MG/1
12.5 TABLET ORAL DAILY
Qty: 30 TABLET | Refills: 0 | Status: SHIPPED | OUTPATIENT
Start: 2025-04-24

## 2025-04-24 NOTE — ASSESSMENT & PLAN NOTE
BP above goal again at 142/70.  Initiate hydrochlorothiazide 12.5 mg p.o. daily.  Plan to recheck BMP next week, will look specifically at potassium and creatinine.  Orders:    hydroCHLOROthiazide 12.5 mg tablet; Take 1 tablet (12.5 mg total) by mouth daily

## 2025-04-24 NOTE — PROGRESS NOTES
Name: Eduardo Joiner      : 1955      MRN: [id number]  Encounter Provider: Mary Lee MD  Encounter Date: 2025   Encounter department: [facility]  :  Assessment & Plan  Primary hypertension  BP above goal again at 142/70.  Initiate hydrochlorothiazide 12.5 mg p.o. daily.  Plan to recheck BMP next week, will look specifically at potassium and creatinine.  Orders:    hydroCHLOROthiazide 12.5 mg tablet; Take 1 tablet (12.5 mg total) by mouth daily    Leg cramps  Minimal concern for claudication as patient has palpable DP pulses bilateral lower extremities.  Likely MSK in nature.  Recommend warm compresses, stretching, can consider magnesium supplementation  Orders:    Magnesium; Future    Return in about 4 weeks (around 2025) for Recheck blood pressure.         History of Present Illness   HPI  Mr. Joiner presents for blood pressure recheck.  He is compliant on Aldactone 25 mg p.o. daily.  He denies symptoms of high blood pressure, no headache, vision changes, chest pain, shortness of breath.  He is tolerating his current blood pressure regimen well.  Blood pressure elevated today  Mr. Joiner complains of leg cramps bilaterally sometimes in thigh, calf or even foot.  Relieves with application of heat.  He has not noticed any association with exercise.      He also complains of scratchy throat occasionally after taking stiolto relieved with cough drop.   Review of Systems   Constitutional:  Negative for chills and fever.   HENT:  Negative for ear pain and sore throat.    Eyes:  Negative for pain and visual disturbance.   Respiratory:  Negative for cough and shortness of breath.    Cardiovascular:  Negative for chest pain and palpitations.   Gastrointestinal:  Negative for abdominal pain and vomiting.   Genitourinary:  Negative for dysuria and hematuria.   Musculoskeletal:  Negative for arthralgias and back pain.   Skin:  Negative for color change and rash.  "  Neurological:  Negative for seizures and syncope.   All other systems reviewed and are negative.      Objective   /70   Pulse 90   Temp 97.6 °F (36.4 °C)   Ht 5' 7\" (1.702 m)   Wt 73.2 kg (161 lb 6.4 oz)   SpO2 92%   BMI 25.28 kg/m²      Physical Exam  Vitals and nursing note reviewed.   Constitutional:       General: He is not in acute distress.     Appearance: He is well-developed.   HENT:      Head: Normocephalic and atraumatic.   Eyes:      Conjunctiva/sclera: Conjunctivae normal.   Cardiovascular:      Rate and Rhythm: Normal rate and regular rhythm.      Pulses:           Dorsalis pedis pulses are 2+ on the right side and 2+ on the left side.      Heart sounds: No murmur heard.  Pulmonary:      Effort: Pulmonary effort is normal. No respiratory distress.      Breath sounds: Normal breath sounds.   Abdominal:      Palpations: Abdomen is soft.      Tenderness: There is no abdominal tenderness.   Musculoskeletal:         General: No swelling.      Cervical back: Neck supple.   Skin:     General: Skin is warm and dry.   Neurological:      Mental Status: He is alert.       Administrative Statements   I have spent a total time of 20 minutes in caring for this patient on the day of the visit/encounter including Diagnostic results, Risks and benefits of tx options, Instructions for management, and Documenting in the medical record.  "

## 2025-05-01 ENCOUNTER — APPOINTMENT (OUTPATIENT)
Dept: LAB | Facility: CLINIC | Age: 70
End: 2025-05-01
Attending: STUDENT IN AN ORGANIZED HEALTH CARE EDUCATION/TRAINING PROGRAM
Payer: COMMERCIAL

## 2025-05-01 DIAGNOSIS — R25.2 LEG CRAMPS: ICD-10-CM

## 2025-05-01 DIAGNOSIS — Z12.5 ENCOUNTER FOR SCREENING FOR MALIGNANT NEOPLASM OF PROSTATE: ICD-10-CM

## 2025-05-01 DIAGNOSIS — R35.1 NOCTURIA: ICD-10-CM

## 2025-05-01 DIAGNOSIS — I10 PRIMARY HYPERTENSION: ICD-10-CM

## 2025-05-01 DIAGNOSIS — J44.9 MIXED RESTRICTIVE AND OBSTRUCTIVE LUNG DISEASE (HCC): ICD-10-CM

## 2025-05-01 DIAGNOSIS — J98.4 MIXED RESTRICTIVE AND OBSTRUCTIVE LUNG DISEASE (HCC): ICD-10-CM

## 2025-05-01 LAB — MAGNESIUM SERPL-MCNC: 1.6 MG/DL (ref 1.9–2.7)

## 2025-05-01 PROCEDURE — 83735 ASSAY OF MAGNESIUM: CPT

## 2025-05-01 PROCEDURE — 36415 COLL VENOUS BLD VENIPUNCTURE: CPT

## 2025-05-06 ENCOUNTER — TELEPHONE (OUTPATIENT)
Age: 70
End: 2025-05-06

## 2025-05-06 NOTE — TELEPHONE ENCOUNTER
Patient called requesting refill for Stiolto Respimat 2.5/2.5 mcg inhaler. Patient made aware medication was refilled on 03/07/25 for 4 g  with 4 refills to [id number] pharmacy. Patient instructed to contact the pharmacy and speak with someone directly to obtain refills of medication. Patient advised to call back for refill if their pharmacy is unable to assist them. Patient verbalized understanding.

## 2025-05-12 ENCOUNTER — RESULTS FOLLOW-UP (OUTPATIENT)
Age: 70
End: 2025-05-12

## 2025-05-22 ENCOUNTER — OFFICE VISIT (OUTPATIENT)
Age: 70
End: 2025-05-22
Payer: COMMERCIAL

## 2025-05-22 VITALS
DIASTOLIC BLOOD PRESSURE: 78 MMHG | SYSTOLIC BLOOD PRESSURE: 130 MMHG | HEART RATE: 85 BPM | HEIGHT: 67 IN | BODY MASS INDEX: 24.96 KG/M2 | TEMPERATURE: 99 F | WEIGHT: 159 LBS | OXYGEN SATURATION: 94 %

## 2025-05-22 DIAGNOSIS — I10 PRIMARY HYPERTENSION: ICD-10-CM

## 2025-05-22 DIAGNOSIS — E83.42 HYPOMAGNESEMIA: Primary | ICD-10-CM

## 2025-05-22 PROCEDURE — 99214 OFFICE O/P EST MOD 30 MIN: CPT | Performed by: STUDENT IN AN ORGANIZED HEALTH CARE EDUCATION/TRAINING PROGRAM

## 2025-05-22 RX ORDER — MAGNESIUM GLYCINATE 100 MG
100 CAPSULE ORAL
Qty: 14 CAPSULE | Refills: 0 | Status: SHIPPED | OUTPATIENT
Start: 2025-05-22

## 2025-05-22 NOTE — ASSESSMENT & PLAN NOTE
Continue spironolactone 50 mg p.o. daily.  Blood pressure at goal.  Please complete BMP as previously ordered

## 2025-05-22 NOTE — PROGRESS NOTES
"Name: Eduardo Joiner      : 1955      MRN: [id number]  Encounter Provider: Mary Lee MD  Encounter Date: 2025   Encounter department: [facility]  :  Assessment & Plan  Hypomagnesemia  Supplement with magnesium glycinate 100 mg once nightly x 14 days.  Repeat magnesium ordered  Orders:    Magnesium Glycinate 100 MG CAPS; Take 100 mg by mouth daily at bedtime    Magnesium; Future    Primary hypertension  Continue spironolactone 50 mg p.o. daily.  Blood pressure at goal.  Please complete BMP as previously ordered              History of Present Illness   Mr. Joiner presents for recheck BP.  BP at goal today. Patient has not initiated HCTZ but as BP currently at goal not interested in starting the medication at this time. Reminded patient to have BMP drawn, will plan to do so when Nevaeh at next chemo session on , along with recheck magnesium.  Recent serology significant for mild hypomagnesemia, patient open to repletion.  Muscle cramps improved.       Review of Systems   Constitutional:  Negative for chills and fever.   HENT:  Negative for ear pain and sore throat.    Eyes:  Negative for pain and visual disturbance.   Respiratory:  Negative for cough and shortness of breath.    Cardiovascular:  Negative for chest pain and palpitations.   Gastrointestinal:  Negative for abdominal pain and vomiting.   Genitourinary:  Negative for dysuria and hematuria.   Musculoskeletal:  Negative for arthralgias and back pain.   Skin:  Negative for color change and rash.   Neurological:  Negative for seizures and syncope.   All other systems reviewed and are negative.      Objective   /78 (BP Location: Left arm, Patient Position: Sitting, Cuff Size: Standard)   Pulse 85   Temp 99 °F (37.2 °C) (Temporal)   Ht 5' 7\" (1.702 m)   Wt 72.1 kg (159 lb)   SpO2 94%   BMI 24.90 kg/m²      Physical Exam  Vitals and nursing note reviewed.   Constitutional:       General: He is not " in acute distress.     Appearance: He is well-developed.   HENT:      Head: Normocephalic and atraumatic.     Eyes:      Conjunctiva/sclera: Conjunctivae normal.       Cardiovascular:      Rate and Rhythm: Normal rate and regular rhythm.      Heart sounds: No murmur heard.  Pulmonary:      Effort: Pulmonary effort is normal. No respiratory distress.      Breath sounds: Normal breath sounds.   Abdominal:      Palpations: Abdomen is soft.      Tenderness: There is no abdominal tenderness.     Musculoskeletal:         General: No swelling.      Cervical back: Neck supple.     Skin:     General: Skin is warm and dry.      Capillary Refill: Capillary refill takes less than 2 seconds.     Neurological:      Mental Status: He is alert.     Psychiatric:         Mood and Affect: Mood normal.

## 2025-05-22 NOTE — PATIENT INSTRUCTIONS
Please call and schedule follow-up with Dr. Healy (pulmonology).     [phone]  Located near [facility]

## 2025-07-17 DIAGNOSIS — I10 PRIMARY HYPERTENSION: ICD-10-CM

## 2025-07-17 DIAGNOSIS — E83.42 HYPOMAGNESEMIA: ICD-10-CM

## 2025-07-17 NOTE — TELEPHONE ENCOUNTER
Reason for call:   [x] Refill   [] Prior Auth  [x] Other: PHARMACY CHANGE    Office:   [x] PCP/Provider - JEAN [facility]   [] Specialty/Provider -     Medication: Magnesium Glycinate 100 MG CAPS Take 100 mg by mouth daily at bedtime   Quantity: 14 capsule      Medication: spironolactone (ALDACTONE) 25 mg tablet Take 2 tablets (50 mg total) by mouth daily   Quantity: 180 tablet    Pharmacy: [address] [facility]   Does the patient have enough for 3 days?   [x] Yes   [] No - Send as HP to POD    Mail Away Pharmacy   Does the patient have enough for 10 days?   [] Yes   [] No - Send as HP to POD

## 2025-07-18 RX ORDER — MAGNESIUM GLYCINATE 100 MG
100 CAPSULE ORAL
Qty: 30 CAPSULE | Refills: 0 | Status: SHIPPED | OUTPATIENT
Start: 2025-07-18

## 2025-07-18 RX ORDER — SPIRONOLACTONE 25 MG/1
50 TABLET ORAL DAILY
Qty: 180 TABLET | Refills: 1 | Status: SHIPPED | OUTPATIENT
Start: 2025-07-18